# Patient Record
Sex: FEMALE | Race: WHITE | NOT HISPANIC OR LATINO | URBAN - METROPOLITAN AREA
[De-identification: names, ages, dates, MRNs, and addresses within clinical notes are randomized per-mention and may not be internally consistent; named-entity substitution may affect disease eponyms.]

---

## 2017-01-22 ENCOUNTER — INPATIENT (INPATIENT)
Facility: HOSPITAL | Age: 27
LOS: 1 days | Discharge: HOME | End: 2017-01-24
Attending: OBSTETRICS & GYNECOLOGY | Admitting: OBSTETRICS & GYNECOLOGY

## 2017-06-27 DIAGNOSIS — Z3A.40 40 WEEKS GESTATION OF PREGNANCY: ICD-10-CM

## 2019-03-08 PROBLEM — Z00.00 ENCOUNTER FOR PREVENTIVE HEALTH EXAMINATION: Status: ACTIVE | Noted: 2019-03-08

## 2019-04-16 ENCOUNTER — APPOINTMENT (OUTPATIENT)
Dept: SURGERY | Facility: CLINIC | Age: 29
End: 2019-04-16
Payer: SELF-PAY

## 2019-04-16 ENCOUNTER — TRANSCRIPTION ENCOUNTER (OUTPATIENT)
Age: 29
End: 2019-04-16

## 2019-04-16 VITALS
DIASTOLIC BLOOD PRESSURE: 82 MMHG | HEIGHT: 64 IN | SYSTOLIC BLOOD PRESSURE: 126 MMHG | WEIGHT: 285 LBS | BODY MASS INDEX: 48.65 KG/M2

## 2019-04-16 PROCEDURE — SI006: CPT

## 2019-05-17 ENCOUNTER — APPOINTMENT (OUTPATIENT)
Dept: SURGERY | Facility: CLINIC | Age: 29
End: 2019-05-17
Payer: MEDICAID

## 2019-05-17 VITALS
WEIGHT: 282.2 LBS | BODY MASS INDEX: 48.18 KG/M2 | SYSTOLIC BLOOD PRESSURE: 122 MMHG | DIASTOLIC BLOOD PRESSURE: 80 MMHG | HEIGHT: 64 IN

## 2019-05-17 DIAGNOSIS — Z78.9 OTHER SPECIFIED HEALTH STATUS: ICD-10-CM

## 2019-05-17 PROCEDURE — 99244 OFF/OP CNSLTJ NEW/EST MOD 40: CPT

## 2019-05-21 ENCOUNTER — APPOINTMENT (OUTPATIENT)
Dept: SURGERY | Facility: CLINIC | Age: 29
End: 2019-05-21
Payer: MEDICAID

## 2019-05-21 VITALS — WEIGHT: 280 LBS | HEIGHT: 64 IN | BODY MASS INDEX: 47.8 KG/M2

## 2019-05-21 PROCEDURE — 97802 MEDICAL NUTRITION INDIV IN: CPT

## 2019-05-24 NOTE — PHYSICAL EXAM
[Normal] : affect appropriate [de-identified] : Mallampati II [de-identified] : Soft, nondistended.

## 2019-05-24 NOTE — CONSULT LETTER
[Courtesy Letter:] : I had the pleasure of seeing your patient, [unfilled], in my office today. [Please see my note below.] : Please see my note below. [Consult Closing:] : Thank you very much for allowing me to participate in the care of this patient.  If you have any questions, please do not hesitate to contact me. [Sincerely,] : Sincerely, [Dear  ___] : Dear  [unfilled], [FreeTextEntry3] : Dottie Sage MD, FACS, FASMBS\par Chief, General, Bariatric & Minimally Invasive Surgery\par Director, Quality & Performance Improvement\par Director, General Surgery Residency Program\par Director, Advanced GI MIS Fellowship\par Department of Surgery\par [facility] \par [facility]\par

## 2019-05-24 NOTE — HISTORY OF PRESENT ILLNESS
[de-identified] : Patient  presented today for Bariatric Surgery Consultation for consideration of Laparoscopic Sleeve Gastrectomy. The patient states that she has been overweight for several years and has tried multiple weight loss modalities in the past without any long-term sustainable weight loss.\par

## 2019-05-24 NOTE — HISTORY OF PRESENT ILLNESS
[de-identified] : Patient  presented today for Bariatric Surgery Consultation for consideration of Laparoscopic Sleeve Gastrectomy. The patient states that she has been overweight for several years and has tried multiple weight loss modalities in the past without any long-term sustainable weight loss.\par

## 2019-05-24 NOTE — CONSULT LETTER
[Courtesy Letter:] : I had the pleasure of seeing your patient, [unfilled], in my office today. [Please see my note below.] : Please see my note below. [Consult Closing:] : Thank you very much for allowing me to participate in the care of this patient.  If you have any questions, please do not hesitate to contact me. [Sincerely,] : Sincerely, [Dear  ___] : Dear  [unfilled], [FreeTextEntry3] : Dottie Sage MD, FACS, FASMBS\par Chief, General, Bariatric & Minimally Invasive Surgery\par Director, Quality & Performance Improvement\par Director, General Surgery Residency Program\par Director, Advanced GI MIS Fellowship\par Department of Surgery\par [facility] \par [facility]\par

## 2019-05-24 NOTE — PHYSICAL EXAM
[Normal] : affect appropriate [de-identified] : Mallampati II [de-identified] : Soft, nondistended.

## 2019-05-24 NOTE — ASSESSMENT
[FreeTextEntry1] : XAVIER HARKINS is a 28 year female seen today for Bariatric consultation. In an effort to prepare for surgery she is exercising daily for 15-20 minutes.\par The surgical options for weight loss have been extensively discussed with the patient and questions answered. The patient was provided written and verbal education regarding the sleeve gastrectomy. The patient appears to have a reasonable understanding of the process and is ready to proceed.  Risks, including but not limited to:  anesthesia, death, bleeding, infection, leaks, blood clots, ulcers, malnutrition and need for additional operations have been reviewed.  The importance of a consistent diet and exercise regimen in regards to weight loss and maintenance has also been discussed and the patient agrees to actively participate in the process.  The importance of lifelong mineral and vitamin supplementation was also reviewed with the patient. The need to adhere to an appropriate diet and exercise regimen were emphasized; in particular the need to perform moderate to intense physical activity most days of the week.  No promises have been made in regards to any given outcome and possibility of inadequate weight loss as well as regain has been discussed with the patient.  The patient understands that a long-term commitment is necessary for long-term success.\par

## 2019-05-24 NOTE — DATA REVIEWED
[FreeTextEntry1] : Blood work reviewed.  Vitamin d 25 OH - 20.  Prescribed vitamin d 50,000 units weekly for 2 months then patient will take vitamin d 2000 units daily on completion of prescription.  Written information given to patient.

## 2019-05-24 NOTE — REASON FOR VISIT
[Initial Consult] : an initial consult for [Morbid Obesity (BMI>40)] : morbid obesity (bmi>40) [Friend] : friend [FreeTextEntry2] : Patient presents for Bariatric consultation.

## 2019-06-09 ENCOUNTER — FORM ENCOUNTER (OUTPATIENT)
Age: 29
End: 2019-06-09

## 2019-06-10 ENCOUNTER — OUTPATIENT (OUTPATIENT)
Dept: OUTPATIENT SERVICES | Facility: HOSPITAL | Age: 29
LOS: 1 days | Discharge: HOME | End: 2019-06-10
Payer: MEDICAID

## 2019-06-10 ENCOUNTER — OTHER (OUTPATIENT)
Age: 29
End: 2019-06-10

## 2019-06-10 DIAGNOSIS — E66.01 MORBID (SEVERE) OBESITY DUE TO EXCESS CALORIES: ICD-10-CM

## 2019-06-10 PROCEDURE — 76700 US EXAM ABDOM COMPLETE: CPT | Mod: 26

## 2019-06-12 ENCOUNTER — OUTPATIENT (OUTPATIENT)
Dept: OUTPATIENT SERVICES | Facility: HOSPITAL | Age: 29
LOS: 1 days | Discharge: HOME | End: 2019-06-12

## 2019-06-12 DIAGNOSIS — F50.9 EATING DISORDER, UNSPECIFIED: ICD-10-CM

## 2019-06-18 ENCOUNTER — APPOINTMENT (OUTPATIENT)
Dept: SURGERY | Facility: CLINIC | Age: 29
End: 2019-06-18
Payer: MEDICAID

## 2019-06-18 ENCOUNTER — APPOINTMENT (OUTPATIENT)
Dept: CARDIOLOGY | Facility: CLINIC | Age: 29
End: 2019-06-18
Payer: MEDICAID

## 2019-06-18 VITALS — BODY MASS INDEX: 45.14 KG/M2 | HEIGHT: 64 IN | WEIGHT: 264.4 LBS

## 2019-06-18 VITALS — DIASTOLIC BLOOD PRESSURE: 80 MMHG | SYSTOLIC BLOOD PRESSURE: 115 MMHG

## 2019-06-18 PROCEDURE — 93000 ELECTROCARDIOGRAM COMPLETE: CPT

## 2019-06-18 PROCEDURE — 99203 OFFICE O/P NEW LOW 30 MIN: CPT

## 2019-06-18 PROCEDURE — 99212 OFFICE O/P EST SF 10 MIN: CPT

## 2019-06-28 ENCOUNTER — CLINICAL ADVICE (OUTPATIENT)
Age: 29
End: 2019-06-28

## 2019-07-10 ENCOUNTER — APPOINTMENT (OUTPATIENT)
Dept: CARDIOLOGY | Facility: CLINIC | Age: 29
End: 2019-07-10
Payer: MEDICAID

## 2019-07-10 DIAGNOSIS — R01.1 CARDIAC MURMUR, UNSPECIFIED: ICD-10-CM

## 2019-07-10 PROCEDURE — 93306 TTE W/DOPPLER COMPLETE: CPT

## 2019-07-11 NOTE — ADDENDUM
[FreeTextEntry1] : Echo reviewed (see scanned report)\par \par Low-risk for intermediate-risk surgery\par

## 2019-07-11 NOTE — DISCUSSION/SUMMARY
[Procedure Intermediate Risk] : the procedure risk is intermediate [FreeTextEntry1] : Echo prior to clearance

## 2019-07-11 NOTE — PHYSICAL EXAM
[General Appearance - In No Acute Distress] : no acute distress [General Appearance - Well Developed] : well developed [Eyelids - No Xanthelasma] : the eyelids demonstrated no xanthelasmas [Normal Conjunctiva] : the conjunctiva exhibited no abnormalities [Respiration, Rhythm And Depth] : normal respiratory rhythm and effort [Exaggerated Use Of Accessory Muscles For Inspiration] : no accessory muscle use [Auscultation Breath Sounds / Voice Sounds] : lungs were clear to auscultation bilaterally [Heart Rate And Rhythm] : heart rate and rhythm were normal [Heart Sounds] : normal S1 and S2 [Abdomen Soft] : soft [Abdomen Tenderness] : non-tender [Abdomen Mass (___ Cm)] : no abdominal mass palpated [Gait - Sufficient For Exercise Testing] : the gait was sufficient for exercise testing [Abnormal Walk] : normal gait [Nail Clubbing] : no clubbing of the fingernails [Cyanosis, Localized] : no localized cyanosis [Skin Color & Pigmentation] : normal skin color and pigmentation [No Skin Ulcers] : no skin ulcer [] : no rash [Oriented To Time, Place, And Person] : oriented to person, place, and time [FreeTextEntry1] : 2/6 systolic, no edema

## 2019-07-11 NOTE — HISTORY OF PRESENT ILLNESS
[Scheduled Procedure ___] : a [unfilled] [Surgeon Name ___] : surgeon: [unfilled] [Fair] : Fair [FreeTextEntry1] : \par 29 yo F referred for preop cardiac risk stratification. Denies any recent chest pain, shortness of breath or palpitations. Chart and labs reviewed.\par \par \par

## 2019-07-16 ENCOUNTER — APPOINTMENT (OUTPATIENT)
Dept: SURGERY | Facility: CLINIC | Age: 29
End: 2019-07-16
Payer: MEDICAID

## 2019-07-16 VITALS — WEIGHT: 258 LBS | HEIGHT: 64 IN | BODY MASS INDEX: 44.05 KG/M2

## 2019-07-16 PROCEDURE — 99212 OFFICE O/P EST SF 10 MIN: CPT

## 2019-08-13 ENCOUNTER — APPOINTMENT (OUTPATIENT)
Dept: SURGERY | Facility: CLINIC | Age: 29
End: 2019-08-13
Payer: MEDICAID

## 2019-08-13 VITALS — BODY MASS INDEX: 43.02 KG/M2 | WEIGHT: 252 LBS | HEIGHT: 64 IN

## 2019-08-13 DIAGNOSIS — Z01.810 ENCOUNTER FOR PREPROCEDURAL CARDIOVASCULAR EXAMINATION: ICD-10-CM

## 2019-08-13 PROCEDURE — 99212 OFFICE O/P EST SF 10 MIN: CPT

## 2019-08-13 RX ORDER — MINOCYCLINE HYDROCHLORIDE 100 MG/1
100 CAPSULE ORAL DAILY
Refills: 0 | Status: DISCONTINUED | COMMUNITY
End: 2019-08-13

## 2019-08-13 RX ORDER — ERGOCALCIFEROL 1.25 MG/1
1.25 MG CAPSULE, LIQUID FILLED ORAL
Qty: 4 | Refills: 1 | Status: DISCONTINUED | COMMUNITY
Start: 2019-05-17 | End: 2019-08-13

## 2019-08-26 ENCOUNTER — OTHER (OUTPATIENT)
Age: 29
End: 2019-08-26

## 2019-08-30 ENCOUNTER — RESULT REVIEW (OUTPATIENT)
Age: 29
End: 2019-08-30

## 2019-09-10 ENCOUNTER — APPOINTMENT (OUTPATIENT)
Dept: SURGERY | Facility: CLINIC | Age: 29
End: 2019-09-10
Payer: MEDICAID

## 2019-09-10 VITALS — HEIGHT: 64 IN | BODY MASS INDEX: 42.61 KG/M2 | WEIGHT: 249.6 LBS

## 2019-09-10 PROCEDURE — 99212 OFFICE O/P EST SF 10 MIN: CPT

## 2019-10-01 ENCOUNTER — APPOINTMENT (OUTPATIENT)
Dept: SURGERY | Facility: CLINIC | Age: 29
End: 2019-10-01
Payer: MEDICAID

## 2019-10-01 VITALS — HEIGHT: 64 IN | BODY MASS INDEX: 42.51 KG/M2 | WEIGHT: 249 LBS

## 2019-10-01 PROCEDURE — 99212 OFFICE O/P EST SF 10 MIN: CPT

## 2019-10-14 ENCOUNTER — OUTPATIENT (OUTPATIENT)
Dept: OUTPATIENT SERVICES | Facility: HOSPITAL | Age: 29
LOS: 1 days | Discharge: HOME | End: 2019-10-14
Payer: COMMERCIAL

## 2019-10-14 VITALS
OXYGEN SATURATION: 99 % | HEART RATE: 60 BPM | SYSTOLIC BLOOD PRESSURE: 117 MMHG | TEMPERATURE: 98 F | HEIGHT: 64 IN | DIASTOLIC BLOOD PRESSURE: 64 MMHG | RESPIRATION RATE: 18 BRPM | WEIGHT: 244.71 LBS

## 2019-10-14 DIAGNOSIS — Z98.890 OTHER SPECIFIED POSTPROCEDURAL STATES: Chronic | ICD-10-CM

## 2019-10-14 DIAGNOSIS — E66.01 MORBID (SEVERE) OBESITY DUE TO EXCESS CALORIES: ICD-10-CM

## 2019-10-14 DIAGNOSIS — Z01.818 ENCOUNTER FOR OTHER PREPROCEDURAL EXAMINATION: ICD-10-CM

## 2019-10-14 DIAGNOSIS — K08.409 PARTIAL LOSS OF TEETH, UNSPECIFIED CAUSE, UNSPECIFIED CLASS: Chronic | ICD-10-CM

## 2019-10-14 LAB
ALBUMIN SERPL ELPH-MCNC: 5 G/DL — SIGNIFICANT CHANGE UP (ref 3.5–5.2)
ALP SERPL-CCNC: 67 U/L — SIGNIFICANT CHANGE UP (ref 30–115)
ALT FLD-CCNC: 15 U/L — SIGNIFICANT CHANGE UP (ref 0–41)
ANION GAP SERPL CALC-SCNC: 13 MMOL/L — SIGNIFICANT CHANGE UP (ref 7–14)
APPEARANCE UR: ABNORMAL
APTT BLD: 35.1 SEC — SIGNIFICANT CHANGE UP (ref 27–39.2)
AST SERPL-CCNC: 18 U/L — SIGNIFICANT CHANGE UP (ref 0–41)
BACTERIA # UR AUTO: ABNORMAL
BILIRUB SERPL-MCNC: 0.3 MG/DL — SIGNIFICANT CHANGE UP (ref 0.2–1.2)
BILIRUB UR-MCNC: NEGATIVE — SIGNIFICANT CHANGE UP
BLD GP AB SCN SERPL QL: SIGNIFICANT CHANGE UP
BUN SERPL-MCNC: 14 MG/DL — SIGNIFICANT CHANGE UP (ref 10–20)
CALCIUM SERPL-MCNC: 10.2 MG/DL — HIGH (ref 8.5–10.1)
CHLORIDE SERPL-SCNC: 102 MMOL/L — SIGNIFICANT CHANGE UP (ref 98–110)
CO2 SERPL-SCNC: 26 MMOL/L — SIGNIFICANT CHANGE UP (ref 17–32)
COLOR SPEC: YELLOW — SIGNIFICANT CHANGE UP
CREAT SERPL-MCNC: 0.9 MG/DL — SIGNIFICANT CHANGE UP (ref 0.7–1.5)
DIFF PNL FLD: ABNORMAL
EPI CELLS # UR: 4 /HPF — SIGNIFICANT CHANGE UP (ref 0–5)
GLUCOSE SERPL-MCNC: 96 MG/DL — SIGNIFICANT CHANGE UP (ref 70–99)
GLUCOSE UR QL: NEGATIVE — SIGNIFICANT CHANGE UP
HCT VFR BLD CALC: 41.9 % — SIGNIFICANT CHANGE UP (ref 37–47)
HGB BLD-MCNC: 14 G/DL — SIGNIFICANT CHANGE UP (ref 12–16)
HYALINE CASTS # UR AUTO: 2 /LPF — SIGNIFICANT CHANGE UP (ref 0–7)
INR BLD: 1.01 RATIO — SIGNIFICANT CHANGE UP (ref 0.65–1.3)
KETONES UR-MCNC: NEGATIVE — SIGNIFICANT CHANGE UP
LEUKOCYTE ESTERASE UR-ACNC: ABNORMAL
MCHC RBC-ENTMCNC: 29.7 PG — SIGNIFICANT CHANGE UP (ref 27–31)
MCHC RBC-ENTMCNC: 33.4 G/DL — SIGNIFICANT CHANGE UP (ref 32–37)
MCV RBC AUTO: 89 FL — SIGNIFICANT CHANGE UP (ref 81–99)
NITRITE UR-MCNC: NEGATIVE — SIGNIFICANT CHANGE UP
NRBC # BLD: 0 /100 WBCS — SIGNIFICANT CHANGE UP (ref 0–0)
PH UR: 5.5 — SIGNIFICANT CHANGE UP (ref 5–8)
PLATELET # BLD AUTO: 293 K/UL — SIGNIFICANT CHANGE UP (ref 130–400)
POTASSIUM SERPL-MCNC: 4.3 MMOL/L — SIGNIFICANT CHANGE UP (ref 3.5–5)
POTASSIUM SERPL-SCNC: 4.3 MMOL/L — SIGNIFICANT CHANGE UP (ref 3.5–5)
PROT SERPL-MCNC: 7.7 G/DL — SIGNIFICANT CHANGE UP (ref 6–8)
PROT UR-MCNC: ABNORMAL
PROTHROM AB SERPL-ACNC: 11.6 SEC — SIGNIFICANT CHANGE UP (ref 9.95–12.87)
RBC # BLD: 4.71 M/UL — SIGNIFICANT CHANGE UP (ref 4.2–5.4)
RBC # FLD: 12.2 % — SIGNIFICANT CHANGE UP (ref 11.5–14.5)
RBC CASTS # UR COMP ASSIST: 3 /HPF — SIGNIFICANT CHANGE UP (ref 0–4)
SODIUM SERPL-SCNC: 141 MMOL/L — SIGNIFICANT CHANGE UP (ref 135–146)
SP GR SPEC: 1.02 — SIGNIFICANT CHANGE UP (ref 1.01–1.02)
UROBILINOGEN FLD QL: SIGNIFICANT CHANGE UP
WBC # BLD: 8.13 K/UL — SIGNIFICANT CHANGE UP (ref 4.8–10.8)
WBC # FLD AUTO: 8.13 K/UL — SIGNIFICANT CHANGE UP (ref 4.8–10.8)
WBC UR QL: 35 /HPF — HIGH (ref 0–5)

## 2019-10-14 PROCEDURE — 93010 ELECTROCARDIOGRAM REPORT: CPT

## 2019-10-14 NOTE — H&P PST ADULT - REASON FOR ADMISSION
LOV 10/17/16  Refilled 6/16/17    Sent to pharmacy   Pt denies cp palp uri cough dysuria or sob. ET 1 FOS denies SOB . PT denies any open wounds, drainage or rashes. scheduled for dr ROGERS 10/22  LAP SLEEVE GASTRECTOMY POSSIBLE OPEN POSSIBLE INTRAOPERATIVE ENDOSCOPY.  CLEARANCE PACKET PENDING - PT STATES ALL MD CLEARANCES OBTAINED- WILL OBTAIN FROM DR ROGERS OFFICE  DENIES HX OF STEPHEN

## 2019-10-15 LAB
ESTIMATED AVERAGE GLUCOSE: 105 MG/DL — SIGNIFICANT CHANGE UP (ref 68–114)
HBA1C BLD-MCNC: 5.3 % — SIGNIFICANT CHANGE UP (ref 4–5.6)

## 2019-10-16 ENCOUNTER — APPOINTMENT (OUTPATIENT)
Dept: SURGERY | Facility: CLINIC | Age: 29
End: 2019-10-16
Payer: MEDICAID

## 2019-10-16 VITALS
HEIGHT: 64 IN | WEIGHT: 242.6 LBS | BODY MASS INDEX: 41.42 KG/M2 | SYSTOLIC BLOOD PRESSURE: 122 MMHG | DIASTOLIC BLOOD PRESSURE: 78 MMHG

## 2019-10-16 PROCEDURE — 99215 OFFICE O/P EST HI 40 MIN: CPT

## 2019-10-17 NOTE — ASSESSMENT
[FreeTextEntry1] : XAVIER HARKINS is a 28 year female seen today for Preoperative Bariatric visit. All medications were reviewed with the patient and instructions were given in respect to medications on the day of surgery.  Written instructions provided.\par \par

## 2019-10-17 NOTE — REASON FOR VISIT
[Follow-Up Visit] : a follow-up visit for [FreeTextEntry2] : Patient presents for Preoperative Bariatric visit

## 2019-10-17 NOTE — ADDENDUM
[FreeTextEntry1] : Patient seen, examined and reviewed with practitioner.  I agree with the assessment and plan.\par Risks, benefits and alternatives to the procedure were discussed with the patient and questions answered.  Consent was obtained. The patient is ready to proceed with surgery.\par

## 2019-10-17 NOTE — HISTORY OF PRESENT ILLNESS
[de-identified] : She is scheduled for Laparoscopic Sleeve Gastrectomy on 10/22/2019. The patient states that she has been overweight for several years and has tried multiple weight loss modalities in the past without any long-term sustainable weight loss. \par \par

## 2019-10-21 ENCOUNTER — OTHER (OUTPATIENT)
Age: 29
End: 2019-10-21

## 2019-10-21 RX ORDER — ACETAMINOPHEN AND CODEINE 300; 30 MG/1; MG/1
300-30 TABLET ORAL EVERY 6 HOURS
Qty: 8 | Refills: 0 | Status: DISCONTINUED | COMMUNITY
Start: 2019-10-21 | End: 2019-10-21

## 2019-10-21 RX ORDER — GABAPENTIN 250 MG/5ML
250 SOLUTION ORAL EVERY 8 HOURS
Qty: 15 | Refills: 0 | Status: DISCONTINUED | COMMUNITY
Start: 2019-10-16 | End: 2019-10-21

## 2019-10-22 ENCOUNTER — RESULT REVIEW (OUTPATIENT)
Age: 29
End: 2019-10-22

## 2019-10-22 ENCOUNTER — APPOINTMENT (OUTPATIENT)
Dept: SURGERY | Facility: HOSPITAL | Age: 29
End: 2019-10-22
Payer: MEDICAID

## 2019-10-22 ENCOUNTER — INPATIENT (INPATIENT)
Facility: HOSPITAL | Age: 29
LOS: 0 days | Discharge: HOME | End: 2019-10-23
Attending: SURGERY | Admitting: SURGERY
Payer: MEDICAID

## 2019-10-22 VITALS
WEIGHT: 242.07 LBS | RESPIRATION RATE: 18 BRPM | SYSTOLIC BLOOD PRESSURE: 124 MMHG | HEART RATE: 88 BPM | HEIGHT: 64 IN | DIASTOLIC BLOOD PRESSURE: 68 MMHG | TEMPERATURE: 98 F

## 2019-10-22 DIAGNOSIS — Z98.890 OTHER SPECIFIED POSTPROCEDURAL STATES: Chronic | ICD-10-CM

## 2019-10-22 DIAGNOSIS — K08.409 PARTIAL LOSS OF TEETH, UNSPECIFIED CAUSE, UNSPECIFIED CLASS: Chronic | ICD-10-CM

## 2019-10-22 LAB
ABO RH CONFIRMATION: SIGNIFICANT CHANGE UP
GLUCOSE BLDC GLUCOMTR-MCNC: 104 MG/DL — HIGH (ref 70–99)

## 2019-10-22 PROCEDURE — 43775 LAP SLEEVE GASTRECTOMY: CPT | Mod: 82

## 2019-10-22 PROCEDURE — 99222 1ST HOSP IP/OBS MODERATE 55: CPT

## 2019-10-22 PROCEDURE — 88307 TISSUE EXAM BY PATHOLOGIST: CPT | Mod: 26,59

## 2019-10-22 PROCEDURE — 43775 LAP SLEEVE GASTRECTOMY: CPT

## 2019-10-22 RX ORDER — KETOROLAC TROMETHAMINE 30 MG/ML
15 SYRINGE (ML) INJECTION EVERY 8 HOURS
Refills: 0 | Status: DISCONTINUED | OUTPATIENT
Start: 2019-10-22 | End: 2019-10-23

## 2019-10-22 RX ORDER — HEPARIN SODIUM 5000 [USP'U]/ML
5000 INJECTION INTRAVENOUS; SUBCUTANEOUS ONCE
Refills: 0 | Status: COMPLETED | OUTPATIENT
Start: 2019-10-22 | End: 2019-10-22

## 2019-10-22 RX ORDER — SODIUM CHLORIDE 9 MG/ML
1000 INJECTION, SOLUTION INTRAVENOUS
Refills: 0 | Status: DISCONTINUED | OUTPATIENT
Start: 2019-10-22 | End: 2019-10-22

## 2019-10-22 RX ORDER — GABAPENTIN 400 MG/1
125 CAPSULE ORAL EVERY 8 HOURS
Refills: 0 | Status: DISCONTINUED | OUTPATIENT
Start: 2019-10-22 | End: 2019-10-23

## 2019-10-22 RX ORDER — ONDANSETRON 8 MG/1
4 TABLET, FILM COATED ORAL EVERY 4 HOURS
Refills: 0 | Status: DISCONTINUED | OUTPATIENT
Start: 2019-10-22 | End: 2019-10-23

## 2019-10-22 RX ORDER — MORPHINE SULFATE 50 MG/1
30 CAPSULE, EXTENDED RELEASE ORAL
Refills: 0 | Status: DISCONTINUED | OUTPATIENT
Start: 2019-10-22 | End: 2019-10-23

## 2019-10-22 RX ORDER — SODIUM CHLORIDE 9 MG/ML
1000 INJECTION, SOLUTION INTRAVENOUS
Refills: 0 | Status: DISCONTINUED | OUTPATIENT
Start: 2019-10-22 | End: 2019-10-23

## 2019-10-22 RX ORDER — PANTOPRAZOLE SODIUM 20 MG/1
40 TABLET, DELAYED RELEASE ORAL DAILY
Refills: 0 | Status: DISCONTINUED | OUTPATIENT
Start: 2019-10-22 | End: 2019-10-23

## 2019-10-22 RX ORDER — BUPIVACAINE 13.3 MG/ML
20 INJECTION, SUSPENSION, LIPOSOMAL INFILTRATION ONCE
Refills: 0 | Status: DISCONTINUED | OUTPATIENT
Start: 2019-10-22 | End: 2019-10-23

## 2019-10-22 RX ORDER — MORPHINE SULFATE 50 MG/1
30 CAPSULE, EXTENDED RELEASE ORAL
Refills: 0 | Status: DISCONTINUED | OUTPATIENT
Start: 2019-10-22 | End: 2019-10-22

## 2019-10-22 RX ORDER — ACETAMINOPHEN 500 MG
1000 TABLET ORAL ONCE
Refills: 0 | Status: COMPLETED | OUTPATIENT
Start: 2019-10-22 | End: 2019-10-22

## 2019-10-22 RX ORDER — HYDROMORPHONE HYDROCHLORIDE 2 MG/ML
1 INJECTION INTRAMUSCULAR; INTRAVENOUS; SUBCUTANEOUS
Refills: 0 | Status: DISCONTINUED | OUTPATIENT
Start: 2019-10-22 | End: 2019-10-22

## 2019-10-22 RX ORDER — HYDROMORPHONE HYDROCHLORIDE 2 MG/ML
0.5 INJECTION INTRAMUSCULAR; INTRAVENOUS; SUBCUTANEOUS
Refills: 0 | Status: DISCONTINUED | OUTPATIENT
Start: 2019-10-22 | End: 2019-10-22

## 2019-10-22 RX ORDER — ACETAMINOPHEN 500 MG
1000 TABLET ORAL ONCE
Refills: 0 | Status: DISCONTINUED | OUTPATIENT
Start: 2019-10-22 | End: 2019-10-23

## 2019-10-22 RX ORDER — HEPARIN SODIUM 5000 [USP'U]/ML
5000 INJECTION INTRAVENOUS; SUBCUTANEOUS EVERY 8 HOURS
Refills: 0 | Status: DISCONTINUED | OUTPATIENT
Start: 2019-10-22 | End: 2019-10-23

## 2019-10-22 RX ORDER — CHLORHEXIDINE GLUCONATE 213 G/1000ML
1 SOLUTION TOPICAL
Refills: 0 | Status: DISCONTINUED | OUTPATIENT
Start: 2019-10-22 | End: 2019-10-23

## 2019-10-22 RX ORDER — GABAPENTIN 400 MG/1
125 CAPSULE ORAL EVERY 8 HOURS
Refills: 0 | Status: DISCONTINUED | OUTPATIENT
Start: 2019-10-22 | End: 2019-10-22

## 2019-10-22 RX ADMIN — Medication 15 MILLIGRAM(S): at 13:20

## 2019-10-22 RX ADMIN — GABAPENTIN 125 MILLIGRAM(S): 400 CAPSULE ORAL at 16:52

## 2019-10-22 RX ADMIN — Medication 400 MILLIGRAM(S): at 16:51

## 2019-10-22 RX ADMIN — SODIUM CHLORIDE 125 MILLILITER(S): 9 INJECTION, SOLUTION INTRAVENOUS at 10:33

## 2019-10-22 RX ADMIN — MORPHINE SULFATE 30 MILLILITER(S): 50 CAPSULE, EXTENDED RELEASE ORAL at 12:00

## 2019-10-22 RX ADMIN — Medication 15 MILLIGRAM(S): at 21:33

## 2019-10-22 RX ADMIN — Medication 15 MILLIGRAM(S): at 13:22

## 2019-10-22 RX ADMIN — PANTOPRAZOLE SODIUM 40 MILLIGRAM(S): 20 TABLET, DELAYED RELEASE ORAL at 11:52

## 2019-10-22 RX ADMIN — HEPARIN SODIUM 5000 UNIT(S): 5000 INJECTION INTRAVENOUS; SUBCUTANEOUS at 07:30

## 2019-10-22 RX ADMIN — HEPARIN SODIUM 5000 UNIT(S): 5000 INJECTION INTRAVENOUS; SUBCUTANEOUS at 13:21

## 2019-10-22 RX ADMIN — HEPARIN SODIUM 5000 UNIT(S): 5000 INJECTION INTRAVENOUS; SUBCUTANEOUS at 21:37

## 2019-10-22 RX ADMIN — Medication 1000 MILLIGRAM(S): at 17:00

## 2019-10-22 RX ADMIN — SODIUM CHLORIDE 125 MILLILITER(S): 9 INJECTION, SOLUTION INTRAVENOUS at 21:10

## 2019-10-22 RX ADMIN — Medication 15 MILLIGRAM(S): at 21:47

## 2019-10-22 NOTE — CHART NOTE - NSCHARTNOTEFT_GEN_A_CORE
Post Operative Check    Patient is post op from a Laparoscopic lysis of abdominal adhesions Block, transverse abdominis plane (TAP) Laparoscopic sleeve gastrectomy and is doing well post operatively. Patient has been ambulating frequently around the PACU and has used her PCA pump twice for pain control. Patient is pulling 2250 on IS and has voided 400cc. Patient is feeling very well.    Vitals  T(C): 36.9 (10-22-19 @ 13:00), Max: 37.3 (10-22-19 @ 09:23)  HR: 94 (10-22-19 @ 15:00) (58 - 100)  BP: 125/79 (10-22-19 @ 15:00) (124/68 - 144/82)  RR: 19 (10-22-19 @ 15:00) (12 - 24)  SpO2: 99% (10-22-19 @ 15:00) (98% - 100%)    10-22 @ 07:01  -  10-22 @ 15:49  --------------------------------------------------------  IN:  Total IN: 0 mL    OUT:    Voided: 400 mL  Total OUT: 400 mL    Total NET: -400 mL    PHYSICAL EXAM:  GENERAL: NAD, well-appearing  CHEST/LUNG: Clear to auscultation bilaterally  HEART: Regular rate and rhythm  ABDOMEN: Soft, mildly tender, obese abdomen   EXTREMITIES:  No clubbing, cyanosis, or edema    Labs:  CAPILLARY BLOOD GLUCOSE  POCT Blood Glucose.: 104 mg/dL (22 Oct 2019 05:46)    Assessment:  Patient is a 28y old Female s/p laparoscopic lysis of abdominal adhesions Block, transverse abdominis plane (TAP) Laparoscopic sleeve gastrectomy    Plan:  - NPO  - encourage ambulation  - IS 10x/hour

## 2019-10-22 NOTE — CONSULT NOTE ADULT - SUBJECTIVE AND OBJECTIVE BOX
SICU Consultation Note  ======================================================================================================  XAVIER HARKINS  [id number]    28y Female pmh of acne, obesity (BMI 41) presents today for elective laparoscopic sleeve gastrectomy. Patient was seen by cardiology and is a          Procedure:  s/p lap sleeve gastrectomy                      (BMI=41.6    )  OR time:      EBL:          IV Fluids:       Blood: None              UOP:    None, no isabel  Procedure Findings-      PAST MEDICAL & SURGICAL HISTORY:  Obesity  S/P endoscopy  [place] teeth extracted      Home Meds:   Home Medications:  Iron 100 Plus oral tablet: 1 tab(s) orally once a day (22 Oct 2019 06:17)  Multiple Vitamins oral capsule: 1 cap(s) orally once a day (22 Oct 2019 06:17)  Vitamin D3 2000 intl units oral tablet: 1 tab(s) orally once a day (22 Oct 2019 06:17)      Allergies:  No Known Allergies    Advanced Directives: Full Code d/w patient in PACU    CURRENT MEDICATIONS:   acetaminophen  IVPB .. 1000 milliGRAM(s) IV Intermittent once  BUpivacaine liposome 1.3% Injectable 20 milliLiter(s) Local Injection once  chlorhexidine 4% Liquid 1 Application(s) Topical <User Schedule>  heparin  Injectable 5000 Unit(s) SubCutaneous every 8 hours  HYDROmorphone  Injectable 0.5 milliGRAM(s) IV Push every 10 minutes PRN  HYDROmorphone  Injectable 1 milliGRAM(s) IV Push every 10 minutes PRN  lactated ringers. 1000 milliLiter(s) (100 mL/Hr) IV Continuous <Continuous>  lactated ringers. 1000 milliLiter(s) (125 mL/Hr) IV Continuous <Continuous>  morphine PCA (5 mG/mL) 30 milliLiter(s) PCA Continuous PCA Continuous  pantoprazole  Injectable 40 milliGRAM(s) IV Push daily            VITAL SIGNS, INS/OUTS (Last 24hours):    ICU Vital Signs Last 24 Hrs  T(C): 37.3 (22 Oct 2019 09:23), Max: 37.3 (22 Oct 2019 09:23)  T(F): 99.1 (22 Oct 2019 09:23), Max: 99.1 (22 Oct 2019 09:23)  HR: 64 (22 Oct 2019 10:25) (64 - 100)  BP: 141/85 (22 Oct 2019 10:25) (124/68 - 143/90)  BP(mean): 103 (22 Oct 2019 10:10) (103 - 107)  ABP: --  ABP(mean): --  RR: 16 (22 Oct 2019 10:25) (12 - 24)  SpO2: 98% (22 Oct 2019 10:25) (98% - 100%)    I&O's Summary      Height (cm): 162.56 (10-22-19)  Weight (kg): 109.8 (10-22-19)  BMI (kg/m2): 41.6 (10-22-19)  BSA (m2): 2.12 (10-22-19)    Physical Exam:  ---------------------------------------------------------------------------------------  GCS 15  Exam: A&Ox3, no focal deficits    RESPIRATORY:  Normal expansion/effort    CARDIOVASCULAR:  RRR      GASTROINTESTINAL:  Abdomen soft, mild tender  Trochar sites, dressing applied, no active bleeding    MUSCULOSKELETAL:  Extremities warm, pink, well-perfused.    DERM:  No skin breakdown     :   Exam:       Tubes/Lines/Drains   ----------------------------------------------------------------------------------------------------------  [x] Peripheral IV  [X] Urinary Catheter Isabel                                                   LABS  --------------------------------------------------------------------------------------  LFTs      Cardiac Markers        Coagulation      Arterial Blood Gas      Blood Sugar  CAPILLARY BLOOD GLUCOSE      POCT Blood Glucose.: 104 mg/dL (22 Oct 2019 05:46)      Urinalysis      Cultures            CT/XRAY/ECHO/TCD/EEG  ----------------------------------------------------------------------------------------------          --------------------------------------------------------------------------------------  Admit Diagnosis: E66.01,34174     Critical Care Diagnoses: bariatric surgery SICU Consultation Note  ======================================================================================================  XAVIER HARKINS  [id number]    28y Female pmh of acne, obesity (BMI 41) presents today for elective laparoscopic sleeve gastrectomy. Patient was seen by cardiology and is a low risk for intermediate surgery, Echo July 2019 EF 55-65%, normal diastolic function.  Patient tolerated the procedure without complications. She was extubated, brought to PACU and started on morphine PCA.  SICU consulted for hemodynamic monitoring.        Procedure:  s/p lap sleeve gastrectomy                      (BMI=41.6)  OR time: 2hrs      EBL: 5cc          IV Fluids:  1.3 L     Blood: None              UOP:    None, no isabel      PAST MEDICAL & SURGICAL HISTORY:  Obesity  S/P endoscopy  [place] teeth extracted      Home Meds:   Home Medications:  Iron 100 Plus oral tablet: 1 tab(s) orally once a day (22 Oct 2019 06:17)  Multiple Vitamins oral capsule: 1 cap(s) orally once a day (22 Oct 2019 06:17)  Vitamin D3 2000 intl units oral tablet: 1 tab(s) orally once a day (22 Oct 2019 06:17)      Allergies:  No Known Allergies    Advanced Directives: Full Code d/w patient in PACU    CURRENT MEDICATIONS:   acetaminophen  IVPB .. 1000 milliGRAM(s) IV Intermittent once  BUpivacaine liposome 1.3% Injectable 20 milliLiter(s) Local Injection once  chlorhexidine 4% Liquid 1 Application(s) Topical <User Schedule>  heparin  Injectable 5000 Unit(s) SubCutaneous every 8 hours  HYDROmorphone  Injectable 0.5 milliGRAM(s) IV Push every 10 minutes PRN  HYDROmorphone  Injectable 1 milliGRAM(s) IV Push every 10 minutes PRN  lactated ringers. 1000 milliLiter(s) (100 mL/Hr) IV Continuous <Continuous>  lactated ringers. 1000 milliLiter(s) (125 mL/Hr) IV Continuous <Continuous>  morphine PCA (5 mG/mL) 30 milliLiter(s) PCA Continuous PCA Continuous  pantoprazole  Injectable 40 milliGRAM(s) IV Push daily            VITAL SIGNS, INS/OUTS (Last 24hours):    ICU Vital Signs Last 24 Hrs  T(C): 37.3 (22 Oct 2019 09:23), Max: 37.3 (22 Oct 2019 09:23)  T(F): 99.1 (22 Oct 2019 09:23), Max: 99.1 (22 Oct 2019 09:23)  HR: 64 (22 Oct 2019 10:25) (64 - 100)  BP: 141/85 (22 Oct 2019 10:25) (124/68 - 143/90)  BP(mean): 103 (22 Oct 2019 10:10) (103 - 107)  ABP: --  ABP(mean): --  RR: 16 (22 Oct 2019 10:25) (12 - 24)  SpO2: 98% (22 Oct 2019 10:25) (98% - 100%)    I&O's Summary      Height (cm): 162.56 (10-22-19)  Weight (kg): 109.8 (10-22-19)  BMI (kg/m2): 41.6 (10-22-19)  BSA (m2): 2.12 (10-22-19)    Physical Exam:  ---------------------------------------------------------------------------------------  GCS 15  Exam: A&Ox3, no focal deficits    RESPIRATORY:  Normal expansion/effort    CARDIOVASCULAR:  RRR      GASTROINTESTINAL:  Abdomen soft, mild tender,   Trochar sites, dressing applied, no active bleeding    MUSCULOSKELETAL:  Extremities warm, pink, well-perfused.    DERM:  No skin breakdown     :   Exam: no isabel      Tubes/Lines/Drains   ----------------------------------------------------------------------------------------------------------  [x] Peripheral IV  [X] Urinary Catheter Isabel                                                   LABS  --------------------------------------------------------------------------------------  LFTs      Cardiac Markers        Coagulation      Arterial Blood Gas      Blood Sugar  CAPILLARY BLOOD GLUCOSE      POCT Blood Glucose.: 104 mg/dL (22 Oct 2019 05:46)      Urinalysis      Cultures            CT/XRAY/ECHO/TCD/EEG  ----------------------------------------------------------------------------------------------          --------------------------------------------------------------------------------------  Admit Diagnosis: [id number]     Critical Care Diagnoses: bariatric surgery

## 2019-10-22 NOTE — CHART NOTE - NSCHARTNOTEFT_GEN_A_CORE
PACU ANESTHESIA ADMISSION NOTE      Procedure: Laparoscopic lysis of abdominal adhesions  Block, transverse abdominis plane (TAP)  Laparoscopic sleeve gastrectomy    Post op diagnosis:  Morbid obesity      ____  Intubated  TV:______       Rate: ______      FiO2: ______    _X___  Patent Airway    ____  Full return of protective reflexes    ____  Full recovery from anesthesia / back to baseline status    Vitals:  T: 991F  HR: 103  BP:138/80  RR: 18  SpO2: -100%      Mental Status:  _X___ Awake   _____ Alert   _____ Drowsy   _____ Sedated    Nausea/Vomiting:  ___X_ NO  ______Yes,   See Post - Op Orders          Pain Scale (0-10):  _____    Treatment: ____ None    _X___ See Post - Op/PCA Orders    Post - Operative Fluids:   ____ Oral   ____X See Post - Op Orders    Plan: Discharge:   ____Home       _____Floor     ___X__Critical Care    _____  Other:_________________    Comments: No anesthetic related complications noted. Pt, transported to PACU, report endorsed to RN and sign out given to ICU team

## 2019-10-22 NOTE — BRIEF OPERATIVE NOTE - NSICDXBRIEFPROCEDURE_GEN_ALL_CORE_FT
PROCEDURES:  Laparoscopic lysis of abdominal adhesions 22-Oct-2019 09:26:57  Carlos Barrera  Block, transverse abdominis plane (TAP) 22-Oct-2019 09:14:21  Carlos Barrera  Laparoscopic sleeve gastrectomy 22-Oct-2019 09:14:09  Carlos Barrera

## 2019-10-22 NOTE — CONSULT NOTE ADULT - ATTENDING COMMENTS
I examined the patient with the PA and discussed my plan with them    the patient has mild post-operative pain and has a morphine pca, which she is using only minimally  she will ambulate and is due to void  kasandra diet starting tomorrow

## 2019-10-22 NOTE — CONSULT NOTE ADULT - ASSESSMENT
Assessment & Plan    28y Female  s/p elective lap sleeve gastrectomy, ALLAN, b/l tap block    NEURO:     Pain-controlled with morphine PCA    Gabapentin solution, monitor for any acute change in mental status    RESP:     Oxygen insufficiency-wean off NC to RA as tolerated    Ambulate as tolerated, no chronic resp dx      CARDS:     no chronic dx    keep normotensive, monitor for tachycardia    GI/NUTR:     s/p lap sleeve gastrectomy-keep NPO until 10/23 AM    Diet, NPO x/rx    GI prophylaxis-pantoprazole  Injectable 40 milliGRAM(s) IV Push daily    /RENAL:     No isabel, TOV at 1600    HEME/ONC:     DVT prophylaxis-HSQ    2330 labs    ID:     Prophylaxis-none as per primary team    ENDO:    FSG q6 while NPO     LINES/DRAINS: PIVy     DISPO: SICU approved by Dr. Zamora

## 2019-10-23 ENCOUNTER — TRANSCRIPTION ENCOUNTER (OUTPATIENT)
Age: 29
End: 2019-10-23

## 2019-10-23 VITALS
SYSTOLIC BLOOD PRESSURE: 127 MMHG | HEART RATE: 60 BPM | TEMPERATURE: 97 F | DIASTOLIC BLOOD PRESSURE: 59 MMHG | RESPIRATION RATE: 18 BRPM

## 2019-10-23 LAB
ANION GAP SERPL CALC-SCNC: 13 MMOL/L — SIGNIFICANT CHANGE UP (ref 7–14)
BUN SERPL-MCNC: 7 MG/DL — LOW (ref 10–20)
CALCIUM SERPL-MCNC: 9.3 MG/DL — SIGNIFICANT CHANGE UP (ref 8.5–10.1)
CHLORIDE SERPL-SCNC: 105 MMOL/L — SIGNIFICANT CHANGE UP (ref 98–110)
CO2 SERPL-SCNC: 21 MMOL/L — SIGNIFICANT CHANGE UP (ref 17–32)
CREAT SERPL-MCNC: 0.7 MG/DL — SIGNIFICANT CHANGE UP (ref 0.7–1.5)
GLUCOSE SERPL-MCNC: 99 MG/DL — SIGNIFICANT CHANGE UP (ref 70–99)
HCT VFR BLD CALC: 36.9 % — LOW (ref 37–47)
HGB BLD-MCNC: 12.5 G/DL — SIGNIFICANT CHANGE UP (ref 12–16)
MAGNESIUM SERPL-MCNC: 1.8 MG/DL — SIGNIFICANT CHANGE UP (ref 1.8–2.4)
MCHC RBC-ENTMCNC: 29.8 PG — SIGNIFICANT CHANGE UP (ref 27–31)
MCHC RBC-ENTMCNC: 33.9 G/DL — SIGNIFICANT CHANGE UP (ref 32–37)
MCV RBC AUTO: 87.9 FL — SIGNIFICANT CHANGE UP (ref 81–99)
NRBC # BLD: 0 /100 WBCS — SIGNIFICANT CHANGE UP (ref 0–0)
PHOSPHATE SERPL-MCNC: 3.4 MG/DL — SIGNIFICANT CHANGE UP (ref 2.1–4.9)
PLATELET # BLD AUTO: 275 K/UL — SIGNIFICANT CHANGE UP (ref 130–400)
POTASSIUM SERPL-MCNC: 4.3 MMOL/L — SIGNIFICANT CHANGE UP (ref 3.5–5)
POTASSIUM SERPL-SCNC: 4.3 MMOL/L — SIGNIFICANT CHANGE UP (ref 3.5–5)
RBC # BLD: 4.2 M/UL — SIGNIFICANT CHANGE UP (ref 4.2–5.4)
RBC # FLD: 12.6 % — SIGNIFICANT CHANGE UP (ref 11.5–14.5)
SODIUM SERPL-SCNC: 139 MMOL/L — SIGNIFICANT CHANGE UP (ref 135–146)
WBC # BLD: 11.18 K/UL — HIGH (ref 4.8–10.8)
WBC # FLD AUTO: 11.18 K/UL — HIGH (ref 4.8–10.8)

## 2019-10-23 PROCEDURE — 99231 SBSQ HOSP IP/OBS SF/LOW 25: CPT | Mod: 24

## 2019-10-23 RX ORDER — MAGNESIUM SULFATE 500 MG/ML
1 VIAL (ML) INJECTION ONCE
Refills: 0 | Status: COMPLETED | OUTPATIENT
Start: 2019-10-23 | End: 2019-10-23

## 2019-10-23 RX ADMIN — Medication 15 MILLIGRAM(S): at 05:49

## 2019-10-23 RX ADMIN — PANTOPRAZOLE SODIUM 40 MILLIGRAM(S): 20 TABLET, DELAYED RELEASE ORAL at 11:59

## 2019-10-23 RX ADMIN — HEPARIN SODIUM 5000 UNIT(S): 5000 INJECTION INTRAVENOUS; SUBCUTANEOUS at 13:11

## 2019-10-23 RX ADMIN — CHLORHEXIDINE GLUCONATE 1 APPLICATION(S): 213 SOLUTION TOPICAL at 07:30

## 2019-10-23 RX ADMIN — Medication 50 GRAM(S): at 06:04

## 2019-10-23 RX ADMIN — GABAPENTIN 125 MILLIGRAM(S): 400 CAPSULE ORAL at 12:46

## 2019-10-23 RX ADMIN — GABAPENTIN 125 MILLIGRAM(S): 400 CAPSULE ORAL at 01:08

## 2019-10-23 RX ADMIN — Medication 15 MILLIGRAM(S): at 06:23

## 2019-10-23 RX ADMIN — HEPARIN SODIUM 5000 UNIT(S): 5000 INJECTION INTRAVENOUS; SUBCUTANEOUS at 06:15

## 2019-10-23 NOTE — DISCHARGE NOTE PROVIDER - NSDCFUADDINST_GEN_ALL_CORE_FT
You are being discharged from [facility]. Please follow-up in outpatient clinic with Dr. Sage at your previously scheduled appointment. Please adhere to the diet that was previously discussed with Dr. Sage prior to surgery. Please take medications as prescribed. If you have any further questions about your care, please do not hesitate to contact Dr. Sage's clinic.

## 2019-10-23 NOTE — PROGRESS NOTE ADULT - ASSESSMENT
Assessment:  Patient is a 28y old Female s/p laparoscopic lysis of abdominal adhesions Block, transverse abdominis plane (TAP) Laparoscopic sleeve gastrectomy    Plan:  - care as per POD 1 kasandra protocol Assessment:  Patient is a 28y old Female s/p laparoscopic lysis of abdominal adhesions Block, transverse abdominis plane (TAP) Laparoscopic sleeve gastrectomy    Plan:  Phase 1 bariatric diet per protocol  OOB Ambulating  DVT/GI prophylaxis  Encourage IS

## 2019-10-23 NOTE — PROGRESS NOTE ADULT - SUBJECTIVE AND OBJECTIVE BOX
GENERAL SURGERY PROGRESS NOTE     XAVIER HARKINS  28y  Female  Hospital day: 2d  POD: 1d  Procedure: Laparoscopic lysis of abdominal adhesions  Block, transverse abdominis plane (TAP)  Laparoscopic sleeve gastrectomy    OVERNIGHT EVENTS: no acute events overnight. Patient was OOB to chair, ambulating frequently, consistently using IS 10x/hour.    T(F): 98.5 (10-23-19 @ 00:01), Max: 99.1 (10-22-19 @ 09:23)  HR: 56 (10-23-19 @ 02:03) (56 - 100)  BP: 108/56 (10-23-19 @ 02:03) (106/55 - 144/82)  RR: 19 (10-23-19 @ 02:03) (12 - 24)  SpO2: 96% (10-23-19 @ 02:03) (95% - 100%)    DIET/FLUIDS: lactated ringers. 1000 milliLiter(s) IV Continuous <Continuous>  magnesium sulfate  IVPB 1 Gram(s) IV Intermittent once    GI proph:  pantoprazole  Injectable 40 milliGRAM(s) IV Push daily    AC/ proph: heparin  Injectable 5000 Unit(s) SubCutaneous every 8 hours    PHYSICAL EXAM:  GENERAL: NAD, well-appearing  CHEST/LUNG: Clear to auscultation bilaterally  HEART: Regular rate and rhythm  ABDOMEN: Soft, mildly tender, obese abdomen   EXTREMITIES:  No clubbing, cyanosis, or edema    Labs:  CAPILLARY BLOOD GLUCOSE  POCT Blood Glucose.: 104 mg/dL (22 Oct 2019 05:46)                        12.5   11.18 )-----------( 275      ( 23 Oct 2019 00:22 )             36.9       10-23    139  |  105  |  7<L>  ----------------------------<  99  4.3   |  21  |  0.7    Calcium, Total Serum: 9.3 mg/dL (10-23-19 @ 00:22)

## 2019-10-23 NOTE — DISCHARGE NOTE PROVIDER - CARE PROVIDER_API CALL
Dottie Sage)  Surgery  [address] [place] [address], [address] [place] [address]  Phone: [phone]  Fax: [phone]  Follow Up Time:

## 2019-10-23 NOTE — PROGRESS NOTE ADULT - SUBJECTIVE AND OBJECTIVE BOX
XAVIER HARKINS  [id number]  28y Female    Indication for ICU admission: s/p lap sleeve gastrectomy, BMI 41  Admit Date:10-22-19  ICU Date: 10/22/19  OR Date:10/22/19    No Known Allergies    PAST MEDICAL & SURGICAL HISTORY:  Obesity  S/P endoscopy  [place] teeth extracted    Home Medications:  Iron 100 Plus oral tablet: 1 tab(s) orally once a day (22 Oct 2019 06:17)  Multiple Vitamins oral capsule: 1 cap(s) orally once a day (22 Oct 2019 06:17)  Vitamin D3 2000 intl units oral tablet: 1 tab(s) orally once a day (22 Oct 2019 06:17)    24HRS EVENT: Patient ambulating, voiding without difficulty. No acute overnight events    NEURO:     Pain-controlled with morphine PCA    Gabapentin solution, monitor for any acute change in mental status    RESP:     Oxygen insufficiency-wean off NC to RA as tolerated    Ambulate as tolerated, no chronic resp dx      CARDS:     no chronic dx    keep normotensive, monitor for tachycardia    GI/NUTR:     s/p lap sleeve gastrectomy-keep NPO until 10/23 AM    Diet, NPO x/rx    GI prophylaxis-pantoprazole  Injectable 40 milliGRAM(s) IV Push daily    /RENAL:     voided    HEME/ONC:     DVT prophylaxis-HSQ    Hgb 14    ID:     Prophylaxis-none as per primary team    ENDO:    FSG q6 while NPO     DVT Prophylaxis: heparin  Injectable 5000 Unit(s) SubCutaneous every 8 hours  GI Prophylaxis:pantoprazole  Injectable 40 milliGRAM(s) IV Push daily    ***Tubes/Lines/Drains  ***  Peripheral IV  Arterial Line		                  Central Line                            Urinary Catheter		Indication: Strict I&O

## 2019-10-23 NOTE — PROGRESS NOTE ADULT - ASSESSMENT
Assessment & Plan    28y Female  s/p elective lap sleeve gastrectomy, ALLAN, b/l tap block    NEURO:     Pain-controlled with morphine PCA    Gabapentin solution, monitor for any acute change in mental status    RESP:     Oxygen insufficiency-wean off NC to RA as tolerated    Ambulate as tolerated, no chronic resp dx      CARDS:     no chronic dx    keep normotensive, monitor for tachycardia    GI/NUTR:     s/p lap sleeve gastrectomy-NPO until 10/23 AM    Diet, NPO x/rx    GI prophylaxis-pantoprazole  Injectable 40 milliGRAM(s) IV Push daily    /RENAL:     Voiding    HEME/ONC:     DVT prophylaxis-HSQ    ID:     Prophylaxis-none as per primary team    ENDO:    FSG q6 while NPO

## 2019-10-23 NOTE — CHART NOTE - NSCHARTNOTEFT_GEN_A_CORE
27 y/o F BMI 41, POD1 s/p lap sleeve gastrectomy. Admitted to SICU for close monitoring of potential post op complications. 29 y/o F BMI 41, POD1 s/p lap sleeve gastrectomy. Admitted to SICU for close monitoring of potential post op complications.    NEURO:   Pain control:  - Morphine PCA, Gabapentin solution      RESP:   - No acute issues  - Currently on RA, maintain 02 sat >94%  - IS/PT/OOBTC- pulling 2000 on IS, ambulated      CARDS:   - No acute issues  - MAPS, stable, non tachycardic    GI/NUTR:   Diet:  - Bubba clears    Prophylaxis:  - PPI  - Senna      /RENAL:   - No acute issues  - Voiding  - LR@125  - Monitor Lytes and replete    HEME/ONC:   - No acute issues  - H/H stable  - Hep SQ    ID:   - No abx  - Afebrile, Reactive leukocytosis downtrending     ENDO:  - No acute issues    Signed out to:  Dr. Hills @ 13:10 29 y/o F BMI 41, POD1 s/p lap sleeve gastrectomy. Admitted to SICU for close monitoring of potential post op complications.    NEURO:   Pain control:  - Morphine PCA, Gabapentin solution      RESP:   - No acute issues  - Currently on RA, maintain 02 sat >94%  - IS/PT/OOBTC- pulling 2000 on IS, ambulated      CARDS:   - No acute issues  - MAPS, stable, non tachycardic    GI/NUTR:   Diet:  - Bubba clears    Prophylaxis:  - PPI    /RENAL:   - No acute issues  - Voiding  - LR@125  - Monitor Lytes and replete    HEME/ONC:   - No acute issues  - H/H stable  - Hep SQ    ID:   - No abx  - Afebrile, Reactive leukocytosis downtrending     ENDO:  - No acute issues    Signed out to:  Dr. Hills @ 13:10 27 y/o F BMI 41, POD1 s/p lap sleeve gastrectomy. Admitted to SICU for close monitoring of potential post op complications.    NEURO:   Pain control:  - Morphine PCA- d/c per primary?, Gabapentin solution      RESP:   - No acute issues  - Currently on RA, maintain 02 sat >94%  - IS/PT/OOBTC- pulling 2000 on IS, ambulated      CARDS:   - No acute issues  - MAPS, stable, non tachycardic    GI/NUTR:   Diet:  - Bubba clears    Prophylaxis:  - PPI    /RENAL:   - No acute issues  - Voiding  - LR@125  - Monitor Lytes and replete    HEME/ONC:   - No acute issues  - H/H stable  - Hep SQ    ID:   - No abx  - Afebrile, Reactive leukocytosis downtrending     ENDO:  - No acute issues    Signed out to:  Dr. Hills @ 13:10

## 2019-10-23 NOTE — DISCHARGE NOTE PROVIDER - HOSPITAL COURSE
Patient is a 28 year old female with past medical history of obesity (BMI 41) who was admitted after elective transverse abdominis plane block, laparoscopic lysis of adhesions, and laparoscopic sleeve gastrectomy. Patient was upgraded to SICU care for close monitoring and was given a PCA pump for pain control. She passed her trial of void postoperatively and is having adequate urine output. Her pain is well controlled, she is ambulating, tolerating the bariatric clears diet, and is stable for discharge with follow-up in clinic with Dr. Sage. Patient is a 28 year old female with past medical history of obesity (BMI 41) who was admitted after elective transverse abdominis plane block, laparoscopic lysis of adhesions, and laparoscopic sleeve gastrectomy. The patient did well postoperatively and is stable for discharge.

## 2019-10-23 NOTE — CHART NOTE - NSCHARTNOTEFT_GEN_A_CORE
27 YO F s/p Lap Sleeve - POD #1.  Tolerating kasandra clear liquid diet well at 2 oz/hr.  Has protein shakes and chewable vits/mins at home.  Patient verbalized understanding of phase I diet to begin upon discharge.  DROP sheet reviewed with patient - all questions answered.  Will follow up in office next week.  Call [phone] with questions/concerns.

## 2019-10-23 NOTE — DISCHARGE NOTE NURSING/CASE MANAGEMENT/SOCIAL WORK - PATIENT PORTAL LINK FT
You can access the FollowMyHealth Patient Portal offered by [facility] by registering at the following website: [email or web address]. By joining [employer]’s FollowMyHealth portal, you will also be able to view your health information using other applications (apps) compatible with our system.

## 2019-10-23 NOTE — PROGRESS NOTE ADULT - ATTENDING COMMENTS
I personally examined this patient with the PA and resident and discussed my plan with them.    no complications overnight  ambulating voiding, pain controlled  starting kasandra clears  downgrade
Patient clinically stable. Doing well  DC when tolerating adequate p.o.

## 2019-10-27 LAB — SURGICAL PATHOLOGY STUDY: SIGNIFICANT CHANGE UP

## 2019-10-28 DIAGNOSIS — E66.01 MORBID (SEVERE) OBESITY DUE TO EXCESS CALORIES: ICD-10-CM

## 2019-10-30 ENCOUNTER — APPOINTMENT (OUTPATIENT)
Dept: SURGERY | Facility: CLINIC | Age: 29
End: 2019-10-30
Payer: MEDICAID

## 2019-10-30 VITALS — HEIGHT: 64 IN | WEIGHT: 235.2 LBS | BODY MASS INDEX: 40.15 KG/M2

## 2019-10-30 PROBLEM — E66.9 OBESITY, UNSPECIFIED: Chronic | Status: ACTIVE | Noted: 2019-10-14

## 2019-10-30 PROCEDURE — 99024 POSTOP FOLLOW-UP VISIT: CPT

## 2019-10-30 RX ORDER — ACETAMINOPHEN AND CODEINE 300; 30 MG/1; MG/1
300-30 TABLET ORAL EVERY 6 HOURS
Qty: 8 | Refills: 0 | Status: DISCONTINUED | COMMUNITY
Start: 2019-10-21 | End: 2019-10-30

## 2019-10-30 NOTE — HISTORY OF PRESENT ILLNESS
[Procedure: ___] : Procedure performed: [unfilled]  [Date of Surgery: ___] : Date of Surgery:   [unfilled] [Surgeon Name:   ___] : Surgeon Name: Dr. BRAVO [___ Days Post Op] : [unfilled] days  [Phase 2] : Phase 2 [Walking] : walking

## 2019-11-13 NOTE — DATA REVIEWED
[FreeTextEntry1] : Pre-op Wt: 242.6 lbs. \par Wt. change since last visit: -7.4 lbs. \par TWL: 7.4 lbs. \par %EWL: 6%

## 2019-11-13 NOTE — ASSESSMENT
[___ Days Post Op] : [unfilled] days [Today's Weight: ___] : Today's weight:[unfilled] lbs [Today's BMI: ___] : Today's BMI: [unfilled] [de-identified] : 28F s/p sleeve gastrectomy, POD#7, doing well. She is taking about 57-60oz/day, tolerating phase 2 diet and is taking two protein shakes a day. Her exercise is walking daily and her energy level is great.\par \par Plan:\par Follow up in 3 weeks\par Call if any issues

## 2019-11-13 NOTE — REASON FOR VISIT
[Gastric Sleeve] : gastric sleeve [de-identified] : 10/23/19 [de-identified] : s/p lap sleeve gastrectomy

## 2019-11-13 NOTE — REVIEW OF SYSTEMS
[Negative] : Allergic/Immunologic [Fever] : no fever [Chills] : no chills [Night Sweats] : no night sweats

## 2019-11-22 ENCOUNTER — APPOINTMENT (OUTPATIENT)
Dept: SURGERY | Facility: CLINIC | Age: 29
End: 2019-11-22
Payer: MEDICAID

## 2019-11-22 VITALS — HEIGHT: 64 IN | WEIGHT: 220.8 LBS | BODY MASS INDEX: 37.69 KG/M2

## 2019-11-22 DIAGNOSIS — E66.01 MORBID (SEVERE) OBESITY DUE TO EXCESS CALORIES: ICD-10-CM

## 2019-11-22 PROCEDURE — 99024 POSTOP FOLLOW-UP VISIT: CPT

## 2019-11-22 RX ORDER — PANTOPRAZOLE 40 MG/1
40 TABLET, DELAYED RELEASE ORAL DAILY
Qty: 30 | Refills: 2 | Status: COMPLETED | COMMUNITY
Start: 2019-10-16 | End: 2019-11-22

## 2019-11-22 RX ORDER — IRON/IRON ASP GLY/FA/MV-MIN 38 125-25-1MG
TABLET ORAL DAILY
Refills: 0 | Status: ACTIVE | COMMUNITY

## 2019-11-22 RX ORDER — PEDI MULTIVIT NO.25/FOLIC ACID 300 MCG
60 TABLET,CHEWABLE ORAL DAILY
Refills: 0 | Status: COMPLETED | COMMUNITY
End: 2019-11-22

## 2019-11-28 ENCOUNTER — EMERGENCY (EMERGENCY)
Facility: HOSPITAL | Age: 29
LOS: 0 days | Discharge: HOME | End: 2019-11-28
Admitting: EMERGENCY MEDICINE
Payer: MEDICAID

## 2019-11-28 VITALS
HEIGHT: 64 IN | TEMPERATURE: 98 F | DIASTOLIC BLOOD PRESSURE: 76 MMHG | RESPIRATION RATE: 18 BRPM | SYSTOLIC BLOOD PRESSURE: 149 MMHG | HEART RATE: 80 BPM | OXYGEN SATURATION: 99 % | WEIGHT: 220.02 LBS

## 2019-11-28 DIAGNOSIS — Z98.890 OTHER SPECIFIED POSTPROCEDURAL STATES: Chronic | ICD-10-CM
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
## 2019-11-28 DIAGNOSIS — S93.401A SPRAIN OF UNSPECIFIED LIGAMENT OF RIGHT ANKLE, INITIAL ENCOUNTER: ICD-10-CM
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
## 2019-11-28 DIAGNOSIS — K08.409 PARTIAL LOSS OF TEETH, UNSPECIFIED CAUSE, UNSPECIFIED CLASS: Chronic | ICD-10-CM

## 2019-11-28 DIAGNOSIS — S99.919A UNSPECIFIED INJURY OF UNSPECIFIED ANKLE, INITIAL ENCOUNTER: ICD-10-CM

## 2019-11-28 PROCEDURE — 73610 X-RAY EXAM OF ANKLE: CPT | Mod: 26,RT

## 2019-11-28 PROCEDURE — 73630 X-RAY EXAM OF FOOT: CPT | Mod: 26,RT

## 2019-11-28 PROCEDURE — 29515 APPLICATION SHORT LEG SPLINT: CPT

## 2019-11-28 PROCEDURE — 99283 EMERGENCY DEPT VISIT LOW MDM: CPT | Mod: 25

## 2019-11-28 NOTE — ED PROVIDER NOTE - OBJECTIVE STATEMENT
29 y.o. female with a PMH of a gastric sleeve last month presented to the ER c/o Right foot pain after "twisting" foot last week.  Denies extremity weakness/paresthesias.  No other injuries.

## 2019-11-28 NOTE — ED ADULT NURSE NOTE - OBJECTIVE STATEMENT
Patient c/o right ankle pain x 2 days. Patient states she was walking on rocks and twisted it, at first it wasn't painful. Patient has a burning sensation going up right leg. On assessment no redness or swelling noted, no obvious signs of deformity noted. Patient states it difficult to bear weight to right leg. +pulses in right leg.

## 2019-11-28 NOTE — ED PROVIDER NOTE - PATIENT PORTAL LINK FT
You can access the FollowMyHealth Patient Portal offered by [facility] by registering at the following website: [email or web address]. By joining [employer]’s FollowMyHealth portal, you will also be able to view your health information using other applications (apps) compatible with our system.

## 2019-11-28 NOTE — ED ADULT NURSE NOTE - NSIMPLEMENTINTERV_GEN_ALL_ED
Implemented All Universal Safety Interventions:  [place] to call system. Call bell, personal items and telephone within reach. Instruct patient to call for assistance. Room bathroom lighting operational. Non-slip footwear when patient is off stretcher. Physically safe environment: no spills, clutter or unnecessary equipment. Stretcher in lowest position, wheels locked, appropriate side rails in place.

## 2019-11-28 NOTE — ED PROVIDER NOTE - CLINICAL SUMMARY MEDICAL DECISION MAKING FREE TEXT BOX
RICE; OTC Tylenol as needed; splint and crutches with instructions; ortho referral; pt will follow up with PCP in 2-3 days; any new or worsening symptoms, pt will return to ER.

## 2019-11-28 NOTE — ED PROVIDER NOTE - NSFOLLOWUPINSTRUCTIONS_ED_ALL_ED_FT
Ankle Sprain     An ankle sprain is a stretch or tear in one of the tough tissues (ligaments) that connect the bones in your ankle. An ankle sprain can happen when the ankle rolls outward (inversion sprain) or inward (eversion sprain).  What are the causes?  This condition is caused by rolling or twisting the ankle.  What increases the risk?  You are more likely to develop this condition if you play sports.  What are the signs or symptoms?  Symptoms of this condition include:  Pain in your ankle. Swelling. Bruising. This may happen right after you sprain your ankle or 1–2 days later.Trouble standing or walking.How is this diagnosed?  This condition is diagnosed with:  A physical exam. During the exam, your doctor will press on certain parts of your foot and ankle and try to move them in certain ways.X-ray imaging. These may be taken to see how bad the sprain is and to check for broken bones.How is this treated?  This condition may be treated with:  A brace or splint. This is used to keep the ankle from moving until it heals.An elastic bandage. This is used to support the ankle.Crutches.Pain medicine.Surgery. This may be needed if the sprain is very bad.Physical therapy. This may help to improve movement in the ankle.Follow these instructions at home:  If you have a brace or a splint:     Wear the brace or splint as told by your doctor. Remove it only as told by your doctor.Loosen the brace or splint if your toes:  Tingle. Lose feeling (become numb).Turn cold and blue.Keep the brace or splint clean.If the brace or splint is not waterproof:  Do not let it get wet.Cover it with a watertight covering when you take a bath or a shower.If you have an elastic bandage (dressing):     Remove it to shower or bathe. Try not to move your ankle much, but wiggle your toes from time to time. This helps to prevent swelling. Adjust the dressing if it feels too tight.Loosen the dressing if your foot:   Loses feeling.Tingles.Becomes cold and blue.Managing pain, stiffness, and swelling        Take over-the-counter and prescription medicines only as told by doctor.For 2–3 days, keep your ankle raised (elevated) above the level of your heart.If told, put ice on the injured area:  If you have a removable brace or splint, remove it as told by your doctor.Put ice in a plastic bag. Place a towel between your skin and the bag. Leave the ice on for 20 minutes, 2–3 times a day.General instructions     Rest your ankle.Do not use your injured leg to support your body weight until your doctor says that you can. Use crutches as told by your doctor.Do not use any products that contain nicotine or tobacco, such as cigarettes, e-cigarettes, and chewing tobacco. If you need help quitting, ask your doctor.Keep all follow-up visits as told by your doctor.Contact a doctor if:  Your bruises or swelling are quickly getting worse.Your pain does not get better after you take medicine.Get help right away if:  You cannot feel your toes or foot.Your foot or toes look blue.You have very bad pain that gets worse.Summary  An ankle sprain is a stretch or tear in one of the tough tissues (ligaments) that connect the bones in your ankle.This condition is caused by rolling or twisting the ankle.Symptoms include pain, swelling, bruising, and trouble walking.To help with pain and swelling, put ice on the injured ankle, raise your ankle above the level of your heart, and use an elastic bandage. Also, rest as told by your doctor.Keep all follow-up visits as told by your doctor. This is important.This information is not intended to replace advice given to you by your health care provider. Make sure you discuss any questions you have with your health care provider.    Document Released: 06/05/2009 Document Revised: 05/14/2019 Document Reviewed: 05/14/2019  [employer] Interactive Patient Education © 2019 [employer]

## 2019-11-28 NOTE — ED PROVIDER NOTE - CARE PROVIDER_API CALL
Brennan Marshall (MD)  Orthopaedic Surgery  [address] [place] [address]  Phone: [phone]  Fax: [phone]  Follow Up Time: 1-3 Days

## 2019-12-02 NOTE — DATA REVIEWED
[FreeTextEntry1] : \par Wt. change since last visit: -14.4 lbs\par %EWL: 18\par TWL: 21.8 lbs\par BMI: 37.9\par

## 2019-12-02 NOTE — PHYSICAL EXAM
[Normal] : normal appearance, no rash, nodules, vesicles, ulcers, erythema [de-identified] : Soft, nondistended.  Well healed incisions

## 2019-12-02 NOTE — HISTORY OF PRESENT ILLNESS
[Procedure: ___] : Procedure performed: [unfilled]  [Date of Surgery: ___] : Date of Surgery:   [unfilled] [Pre-Op Weight ___] : Pre-op weight was [unfilled] lbs [___ Months Post Op] : [unfilled] months [de-identified] : Patient had surgery approximately 1 month ago. \par She denies heartburn/reflux/vomiting and food intolerance.\par

## 2019-12-02 NOTE — REASON FOR VISIT
[Post Operative Visit] : a post operative visit for [Other___] : [unfilled] [FreeTextEntry2] : Sleeve Gastrectomy on 10/23/19

## 2019-12-02 NOTE — PLAN
[FreeTextEntry1] : PLAN:  Start Actigall on 11/23/19.\par             CMP.\par             Continue with diet and exercise.\par             RTO in 2 months with blood work.\par             Call with concerns.

## 2019-12-02 NOTE — ASSESSMENT
[FreeTextEntry1] : XAVIER HARKINS is a 29 year female seen today for Bariatric postoperative visit. Patient is doing well.\par Patient is compliant with dietary guidelines.\par She eats 3 meals/day.  Breakfast - cottage cheese, [employer] yogurt, oatmeal 1 day/week or 1 egg.  Lunch - protein shake with 1 scoop of protein powder, cottage cheese with spinach or salmon bites.  Dinner - tuna fish, 1/2 veggie burger, shrimp bites with 1-2 spoons of cauliflower rice. \par Fluid intake is adequate with mainly water.\par She is walking daily for 90 minutes. She will add weight bearing exercises in 1 month.\par \par

## 2020-01-03 ENCOUNTER — CLINICAL ADVICE (OUTPATIENT)
Age: 30
End: 2020-01-03

## 2020-01-06 ENCOUNTER — RESULT REVIEW (OUTPATIENT)
Age: 30
End: 2020-01-06

## 2020-01-21 DIAGNOSIS — E56.9 VITAMIN DEFICIENCY, UNSPECIFIED: ICD-10-CM

## 2020-01-24 ENCOUNTER — APPOINTMENT (OUTPATIENT)
Dept: SURGERY | Facility: CLINIC | Age: 30
End: 2020-01-24
Payer: MEDICAID

## 2020-01-24 VITALS — HEIGHT: 64 IN | BODY MASS INDEX: 34.59 KG/M2 | WEIGHT: 202.6 LBS

## 2020-01-24 DIAGNOSIS — E55.9 VITAMIN D DEFICIENCY, UNSPECIFIED: ICD-10-CM

## 2020-01-24 PROCEDURE — 99213 OFFICE O/P EST LOW 20 MIN: CPT

## 2020-01-24 RX ORDER — MULTIVITAMIN WITH IRON
TABLET ORAL DAILY
Refills: 0 | Status: ACTIVE | COMMUNITY

## 2020-01-24 RX ORDER — BIOTIN 10 MG
TABLET ORAL DAILY
Refills: 0 | Status: ACTIVE | COMMUNITY

## 2020-01-24 RX ORDER — PANTOPRAZOLE 40 MG/1
40 TABLET, DELAYED RELEASE ORAL DAILY
Refills: 0 | Status: DISCONTINUED | COMMUNITY
End: 2020-01-24

## 2020-02-04 NOTE — PLAN
[FreeTextEntry1] : PLAN: Continue with diet and exercise.\par            RTO in 3 months with blood work.\par            Call with concerns.

## 2020-02-04 NOTE — DATA REVIEWED
[FreeTextEntry1] : Wt. change since last visit: -18.2 lbs\par %EWL: 33\par TWL: 40 lbs\par BMI: 34\par \par Blood work reviewed - all essentially within normal limits.

## 2020-02-04 NOTE — HISTORY OF PRESENT ILLNESS
[Procedure: ___] : Procedure performed: [unfilled]  [Date of Surgery: ___] : Date of Surgery:   [unfilled] [___ Months Post Op] : [unfilled] months [Pre-Op Weight ___] : Pre-op weight was [unfilled] lbs [de-identified] : Patient had surgery approximately 3 months ago. \par She denies heartburn/reflux/vomiting and food intolerance. Constipation improved with smooth move teas.\par  \par

## 2020-02-04 NOTE — REASON FOR VISIT
[Other___] : [unfilled] [Follow-Up Visit] : a follow-up visit for [FreeTextEntry2] : Sleeve Gastrectomy on 10/23/19

## 2020-02-04 NOTE — ASSESSMENT
[FreeTextEntry1] : XAVIER HARKINS is a 29 year female seen today for Bariatric follow up visit.  Patient is doing well.\par Patient is compliant with dietary guidelines.\par She eats 3 meals/day. Breakfast - cottage cheese, Greek yogurt, or 1 egg. Lunch - protein shake with 1 scoop of protein powder, cottage cheese with spinach or deli turkey. Dinner - tuna fish, 1/2 veggie burger, turkey balls with 1-2 spoons of cauliflower rice. \par Fluid intake is adequate with mainly water with 1 cup of decaffeinated coffee.\par She is walking daily for 60 minutes and she joined the gym 2 weeks ago and is using the stair master and the treadmill for 45 minutes 5 days/week. Recommend adding weight bearing exercises.\par \par  \par

## 2020-03-03 ENCOUNTER — EMERGENCY (EMERGENCY)
Facility: HOSPITAL | Age: 30
LOS: 0 days | Discharge: HOME | End: 2020-03-03
Attending: EMERGENCY MEDICINE | Admitting: EMERGENCY MEDICINE
Payer: MEDICAID

## 2020-03-03 VITALS
TEMPERATURE: 99 F | RESPIRATION RATE: 18 BRPM | HEART RATE: 61 BPM | DIASTOLIC BLOOD PRESSURE: 65 MMHG | SYSTOLIC BLOOD PRESSURE: 132 MMHG | OXYGEN SATURATION: 100 % | WEIGHT: 119.93 LBS

## 2020-03-03 DIAGNOSIS — R42 DIZZINESS AND GIDDINESS: ICD-10-CM

## 2020-03-03 DIAGNOSIS — Z98.890 OTHER SPECIFIED POSTPROCEDURAL STATES: Chronic | ICD-10-CM

## 2020-03-03 DIAGNOSIS — Z98.890 OTHER SPECIFIED POSTPROCEDURAL STATES: ICD-10-CM

## 2020-03-03 DIAGNOSIS — Z98.84 BARIATRIC SURGERY STATUS: Chronic | ICD-10-CM

## 2020-03-03 DIAGNOSIS — K08.409 PARTIAL LOSS OF TEETH, UNSPECIFIED CAUSE, UNSPECIFIED CLASS: Chronic | ICD-10-CM

## 2020-03-03 PROCEDURE — 99284 EMERGENCY DEPT VISIT MOD MDM: CPT

## 2020-03-03 RX ORDER — METOCLOPRAMIDE HCL 10 MG
2 TABLET ORAL
Qty: 18 | Refills: 0
Start: 2020-03-03 | End: 2020-03-05

## 2020-03-03 RX ORDER — MECLIZINE HCL 12.5 MG
1 TABLET ORAL
Qty: 14 | Refills: 0
Start: 2020-03-03 | End: 2020-03-09

## 2020-03-03 RX ORDER — MECLIZINE HCL 12.5 MG
25 TABLET ORAL ONCE
Refills: 0 | Status: COMPLETED | OUTPATIENT
Start: 2020-03-03 | End: 2020-03-03

## 2020-03-03 RX ADMIN — Medication 25 MILLIGRAM(S): at 03:30

## 2020-03-03 NOTE — ED PROVIDER NOTE - ATTENDING CONTRIBUTION TO CARE
29y f h/o gastric sleeve p/w dizziness since 5pm. Spinning sensation, worse w/head mvmt. No ha, blurry vision, cp/sob, nv, abd pain, focal numbness or weakness. No falls or head trauma.     PE:  nad  skin warm, dry  ncat  neck supple  rrr nl s1s2 no mrg  ctab no wrr  abd soft ntnd no palpable masses no rgr  back non-tender no cvat  ext no cce dpi  neuro aaox3, cn 2-12 intact bl no nystagmus or facial droop, 5/5 strength x 4 no drift, gross sensation intact, finger-nose nl, gait nl, romberg neg

## 2020-03-03 NOTE — ED PROVIDER NOTE - PROVIDER TOKENS
FREE:[LAST:[Agustin],PHONE:[(   )    -],FAX:[(   )    -],ADDRESS:[your PMD Dr. Velez],FOLLOWUP:[7-10 Days],ESTABLISHEDPATIENT:[T]],PROVIDER:[TOKEN:[[id number]],FOLLOWUP:[7-10 Days]]

## 2020-03-03 NOTE — ED PROVIDER NOTE - CARE PROVIDER_API CALL
Agustin,   your PMD Dr. Velez  Phone: (   )    -  Fax: (   )    -  Established Patient  Follow Up Time: 7-10 Days    Troy Adkins)  Otolaryngology  [address] [place] [address] [address] [place] [address]  Phone: [phone]  Fax: [phone]  Follow Up Time: 7-10 Days

## 2020-03-03 NOTE — ED PROVIDER NOTE - OBJECTIVE STATEMENT
29 y.o F w/ pmhx gastric sleeve done 10/2019 p/w sudden onset dizziness that began at 5pm tonight, constant, worse with head movement. Pt has never had symptoms like this before. Pt states she tried to drink water thinking she was dehydrated but dizziness didn't improve. Pt waited to find someone to take care of her daughter and came to ED as soon as she could. Otherwise, pt denies fever, no chills, no blurry vision or change in vision, pt states she had singing in her R ear earlier which is no longer there, no change in hearing, no rhinorrhea, no cough, no HA, no CP, no SOB, no n/v, no abd pain, no numbness or tingling. LMP a few days ago. pt has IUD.

## 2020-03-03 NOTE — ED ADULT NURSE NOTE - OBJECTIVE STATEMENT
Pt comes from home complaining of dizziness and room spinning. No hx of vertigo. No other complaints.

## 2020-03-03 NOTE — ED PROVIDER NOTE - PHYSICAL EXAMINATION
CONSTITUTIONAL: well-appearing, in NAD  SKIN: Warm dry, normal skin turgor  HEAD: NCAT  EYES: EOMI, PERRLA, no scleral icterus, conjunctiva pink  ENT: normal pharynx with no erythema or exudates  NECK: Supple; non tender. Full ROM.  CARD: RRR, no murmurs.  RESP: clear to ausculation b/l. No crackles or wheezing.  ABD: soft, non-tender, non-distended, no rebound or guarding.  EXT: Full ROM, no bony tenderness, no pedal edema, no calf tenderness  NEURO: normal motor. normal sensory. CN II-XII intact. Cerebellar testing normal. Normal gait. Normal Head impulse.  PSYCH: Cooperative, appropriate.

## 2020-03-03 NOTE — ED PROVIDER NOTE - PATIENT PORTAL LINK FT
You can access the FollowMyHealth Patient Portal offered by [facility] by registering at the following website: [email or web address]. By joining [employer]’s FollowMyHealth portal, you will also be able to view your health information using other applications (apps) compatible with our system.

## 2020-03-03 NOTE — ED PROVIDER NOTE - CARE PROVIDERS DIRECT ADDRESSES
,[email or web address],[email or web address]

## 2020-03-23 ENCOUNTER — APPOINTMENT (OUTPATIENT)
Dept: OTOLARYNGOLOGY | Facility: CLINIC | Age: 30
End: 2020-03-23

## 2020-04-21 ENCOUNTER — APPOINTMENT (OUTPATIENT)
Dept: SURGERY | Facility: CLINIC | Age: 30
End: 2020-04-21
Payer: MEDICAID

## 2020-04-21 VITALS — HEIGHT: 64 IN | WEIGHT: 186 LBS | BODY MASS INDEX: 31.76 KG/M2

## 2020-04-21 PROCEDURE — ZZZZZ: CPT

## 2020-04-24 ENCOUNTER — APPOINTMENT (OUTPATIENT)
Dept: SURGERY | Facility: CLINIC | Age: 30
End: 2020-04-24

## 2020-06-04 ENCOUNTER — APPOINTMENT (OUTPATIENT)
Dept: OTOLARYNGOLOGY | Facility: CLINIC | Age: 30
End: 2020-06-04
Payer: MEDICAID

## 2020-06-04 PROCEDURE — 92557 COMPREHENSIVE HEARING TEST: CPT

## 2020-06-04 PROCEDURE — 92550 TYMPANOMETRY & REFLEX THRESH: CPT

## 2020-06-04 PROCEDURE — 99204 OFFICE O/P NEW MOD 45 MIN: CPT | Mod: 25

## 2020-06-04 NOTE — HISTORY OF PRESENT ILLNESS
[de-identified] : 29 year old patient is present today for dizziness. Patient states it started in March. She states it happens in the morning with room spinning. She notices when she sleeps on her right side she feels herself spinning. Patient admits high pitched noise in her ears. Dizziness still occurs not as frequent. She has episodes when turning to the right in bed that lasts seconds.

## 2020-06-15 ENCOUNTER — RX RENEWAL (OUTPATIENT)
Age: 30
End: 2020-06-15

## 2020-07-01 ENCOUNTER — OUTPATIENT (OUTPATIENT)
Dept: OUTPATIENT SERVICES | Facility: HOSPITAL | Age: 30
LOS: 1 days | Discharge: HOME | End: 2020-07-01
Payer: MEDICAID

## 2020-07-01 DIAGNOSIS — R42 DIZZINESS AND GIDDINESS: ICD-10-CM

## 2020-07-01 DIAGNOSIS — Z98.890 OTHER SPECIFIED POSTPROCEDURAL STATES: Chronic | ICD-10-CM

## 2020-07-01 DIAGNOSIS — K08.409 PARTIAL LOSS OF TEETH, UNSPECIFIED CAUSE, UNSPECIFIED CLASS: Chronic | ICD-10-CM

## 2020-07-01 DIAGNOSIS — Z98.84 BARIATRIC SURGERY STATUS: Chronic | ICD-10-CM

## 2020-07-01 PROCEDURE — 70553 MRI BRAIN STEM W/O & W/DYE: CPT | Mod: 26

## 2020-07-21 ENCOUNTER — APPOINTMENT (OUTPATIENT)
Dept: SURGERY | Facility: CLINIC | Age: 30
End: 2020-07-21

## 2020-07-22 ENCOUNTER — APPOINTMENT (OUTPATIENT)
Dept: OTOLARYNGOLOGY | Facility: CLINIC | Age: 30
End: 2020-07-22
Payer: MEDICAID

## 2020-07-22 DIAGNOSIS — H81.10 BENIGN PAROXYSMAL VERTIGO, UNSPECIFIED EAR: ICD-10-CM

## 2020-07-22 DIAGNOSIS — H93.13 TINNITUS, BILATERAL: ICD-10-CM

## 2020-07-22 PROCEDURE — 99213 OFFICE O/P EST LOW 20 MIN: CPT

## 2020-07-22 NOTE — HISTORY OF PRESENT ILLNESS
[FreeTextEntry1] : Patient presents today following up on dizziness. Patient had MRI performed. She notes dizziness is still present when exercising or turning to the right side. MRI is negative and unremarkable as reviewed by me. ringing has gone away. Pt has deferred therapy until MRI.

## 2020-08-04 ENCOUNTER — APPOINTMENT (OUTPATIENT)
Dept: SURGERY | Facility: CLINIC | Age: 30
End: 2020-08-04
Payer: MEDICAID

## 2020-08-04 VITALS — BODY MASS INDEX: 29.88 KG/M2 | HEIGHT: 64 IN | WEIGHT: 175 LBS

## 2020-08-04 DIAGNOSIS — R42 DIZZINESS AND GIDDINESS: ICD-10-CM

## 2020-08-04 PROCEDURE — 99212 OFFICE O/P EST SF 10 MIN: CPT | Mod: 95

## 2020-08-04 RX ORDER — AMOXICILLIN 500 MG
CAPSULE ORAL DAILY
Refills: 0 | Status: ACTIVE | COMMUNITY

## 2020-08-04 RX ORDER — NICOTINE 11MG/24HR
50 MCG PATCH, TRANSDERMAL 24 HOURS TRANSDERMAL DAILY
Refills: 0 | Status: ACTIVE | COMMUNITY

## 2020-08-04 RX ORDER — URSODIOL 300 MG/1
300 CAPSULE ORAL
Qty: 60 | Refills: 5 | Status: DISCONTINUED | COMMUNITY
Start: 2019-11-22 | End: 2020-08-04

## 2020-08-04 NOTE — PLAN
[FreeTextEntry1] : PLAN:  Continue with diet and exercise.\par             RTO in 2 months for 1 year visit.\par             Mail blood work requisition.\par             Call with concerns.

## 2020-08-04 NOTE — HISTORY OF PRESENT ILLNESS
[Procedure: ___] : Procedure performed: [unfilled]  [Date of Surgery: ___] : Date of Surgery:   [unfilled] [Pre-Op Weight ___] : Pre-op weight was [unfilled] lbs [___ Months Post Op] : [unfilled] months [de-identified] : The visit was provided via Telehealth using real-time 2 way audio visual technology.  The patient XAVIER HARKINS was located at home [address] [address] at the time of the visit.  \par The provider, ROSAURA CISNEROS, was located at the medical office at [address] [place] [address] at the time of the visit.  The patient XAVIER HARKINS and provider participated in the telehealth encounter.  Verbal consent was given on 8/4/2020 by the patient XAVIER HARKINS , self.\par  [de-identified] : Patient had surgery approximately 10 months ago. She followed up with Dr. Adkins with c/o of dizziness earlier this month. \par She denies heartburn/reflux/vomiting and food intolerance. Constipation improved with smooth move teas.\par  \par

## 2020-08-04 NOTE — DATA REVIEWED
[FreeTextEntry1] : Wt. change since last visit: -11 lbs\par %EWL: 55\par TWL: 67.6 lbs\par BMI: 30\par

## 2020-08-04 NOTE — ASSESSMENT
[FreeTextEntry1] : XAVIER HARKINS is a 29 year female seen today for Bariatric follow up visit. Patient is doing well with her weight loss goals..\par Patient is compliant with dietary guidelines.\par She eats 3 meals/day. Breakfast - cottage cheese, Greek yogurt, or protein coffee. Lunch - cottage cheese with spinach or deli turkey. Dinner - tuna fish, 1/2 veggie burger, turkey balls with 1-2 spoons of cauliflower rice. \par Fluid intake is adequate with mainly water. She often drinks ~ with 150 ounces daily.\par She is walking, jogging or doing a vi[employer] camp class for 45-60 minutes 5-6 days/week.\par \par

## 2020-10-20 ENCOUNTER — APPOINTMENT (OUTPATIENT)
Dept: SURGERY | Facility: CLINIC | Age: 30
End: 2020-10-20
Payer: MEDICAID

## 2020-10-20 VITALS — HEIGHT: 64 IN | WEIGHT: 168 LBS | BODY MASS INDEX: 28.68 KG/M2

## 2020-10-20 DIAGNOSIS — L70.0 ACNE VULGARIS: ICD-10-CM

## 2020-10-20 DIAGNOSIS — Z86.39 PERSONAL HISTORY OF OTHER ENDOCRINE, NUTRITIONAL AND METABOLIC DISEASE: ICD-10-CM

## 2020-10-20 PROCEDURE — 99212 OFFICE O/P EST SF 10 MIN: CPT | Mod: 95

## 2020-10-20 RX ORDER — MULTIVIT-MINS NO.5/FOLIC ACID 1 MG
CAPSULE ORAL DAILY
Refills: 0 | Status: ACTIVE | COMMUNITY

## 2020-10-20 NOTE — DATA REVIEWED
[FreeTextEntry1] : Wt. change since last visit:- 7 lbs \par %EWL: 61\par TWL: 74.6 lbs\par BMI: 28.8\par \par Blood work reviewed with patient. All essentially within normal limits.\par \par

## 2020-10-20 NOTE — REASON FOR VISIT
[S/P Bariatric Surgery] : s/p bariatric surgery [FreeTextEntry2] : Sleeve Gastrectomy on 10/23/2019 [Follow-Up Visit] : a follow-up visit for

## 2020-10-20 NOTE — ASSESSMENT
[FreeTextEntry1] : XAVIER HARKINS is a 29 year female seen today for Bariatric follow up visit. Patient is doing well with her weight loss goals..\par Patient is compliant with dietary guidelines and continues to plan her meals.\par She eats 3 meals/day. Breakfast - low sugar oatmeal, Greek yogurt with peanut butter, or protein coffee. Lunch - cottage cheese with spinach or deli turkey. Dinner - tuna fish, 1/2 veggie burger, turkey balls with 1-2 spoons of cauliflower rice. Snack - string cheese. Recommend adding CHO and fruits to her diet.\par Fluid intake is adequate with mainly water. She often drinks ~ with 150 ounces daily.\par She is walking, jogging or doing a [employer] camp class for 45-60 minutes 5-6 days/week with a  at the park.\par \par

## 2020-10-20 NOTE — HISTORY OF PRESENT ILLNESS
[Procedure: ___] : Procedure performed: [unfilled]  [Pre-Op Weight ___] : Pre-op weight was [unfilled] lbs [Date of Surgery: ___] : Date of Surgery:   [unfilled] [___ Years Post Op] : [unfilled] years [de-identified] : Patient had surgery approximately 1 year ago. She is c/o cystic acne and she has a follow up appointment with dermatology. \par She denies heartburn/reflux/vomiting and food intolerance. Constipation improved with smooth move teas.\par  [de-identified] : \par The visit was provided via Telehealth using real-time 1 way audio technology. The patient XAVIER HARKINS was located at home [address] [place] [address], [place] [address] at the time of the visit. \par The provider, RSOAURA CISNEROS, was located at the medical office at [address] [place] [address] at the time of the visit. The patient XAVIER HARKINS and provider participated in the telehealth encounter. Verbal consent was given on 8/4/2020 by the patient XAVIER HARKINS , self.\par

## 2021-04-09 ENCOUNTER — NON-APPOINTMENT (OUTPATIENT)
Age: 31
End: 2021-04-09

## 2021-05-12 ENCOUNTER — NON-APPOINTMENT (OUTPATIENT)
Age: 31
End: 2021-05-12

## 2021-05-14 ENCOUNTER — APPOINTMENT (OUTPATIENT)
Dept: PLASTIC SURGERY | Facility: CLINIC | Age: 31
End: 2021-05-14
Payer: MEDICAID

## 2021-05-14 VITALS — BODY MASS INDEX: 29.53 KG/M2 | HEIGHT: 64 IN | WEIGHT: 173 LBS

## 2021-05-14 PROCEDURE — 99203 OFFICE O/P NEW LOW 30 MIN: CPT

## 2021-05-14 PROCEDURE — 99072 ADDL SUPL MATRL&STAF TM PHE: CPT

## 2021-05-14 NOTE — ASSESSMENT
[FreeTextEntry1] : 29 yo F with h/o morbid obesity with MWL s/p sleeve gastrectomy who is a good candidate for body contouring surgery.  Grade III hanging abdominal pannus over BL hip and thighs.\par \par -Recommend Courtney de Lis extended panniculectomy to address horizontal and vertical pannus\par \par -Regarding the procedure, the discussed the benefits, risks, and outcomes. I explained the risk of bleeding, infection, hematoma, seroma, poor wound healing, wound separation, fat and/or tissue necrosis, hypertrophic scar/keloid formation, umbilicus ischemia, risk of VTE and possible pulmonary embolism, and need for re-admission, and dissatisfaction with the outcome. I also discussed the anticipated scar locations (hip-to-hip; vertical midline; periumbilical), use of surgical drains, and need for post-operative use of an abdominal binder and lifting restrictions after surgery.\par -She understands the supra-umbilical striae is not addressed with abdominoplasty.\par -Patient understood option of panniculectomy vs Courtney de Lis panniculectomy.  Pt elected to undergo Courtney de Lis extended panniculectomy\par -The patient understand the risks and post-operative expectations.\par -All the patient's questions were answered to her apparent satisfaction. Informed consent was obtained.\par -Pre-op photos were taken\par -Will schedule for outpatient BUTCH procedure under GA\par \par Photos were taken\par \par Due to COVID-19, pre-visit patient instructions were explained to the patient and their symptoms were checked upon arrival. Masks were used by the healthcare provider and staff and the examination room was cleaned after the patient visit concluded\par \par

## 2021-05-14 NOTE — PHYSICAL EXAM
[de-identified] : well developed pleasant female, NAD [de-identified] : NC/AT [de-identified] : PERRL [de-identified] : supple [de-identified] : unlabored breathing  [de-identified] : TONOR [de-identified] : abdominal pannus with horizontal and vertical skin laxity hanging over hip and thigh, extensive striae; poor skin tone and elasticity\par no palpable hernias, no significant rectus diastasis. [de-identified] : FROM x4

## 2021-05-14 NOTE — PHYSICAL EXAM
[de-identified] : well developed pleasant female, NAD [de-identified] : NC/AT [de-identified] : PERRL [de-identified] : supple [de-identified] : unlabored breathing  [de-identified] : TONOR [de-identified] : abdominal pannus with horizontal and vertical skin laxity hanging over hip and thigh, extensive striae; poor skin tone and elasticity\par no palpable hernias, no significant rectus diastasis. [de-identified] : FROM x4

## 2021-05-14 NOTE — ASSESSMENT
[FreeTextEntry1] : 31 yo F with h/o morbid obesity with MWL s/p sleeve gastrectomy who is a good candidate for body contouring surgery.  Grade III hanging abdominal pannus over BL hip and thighs.\par \par -Recommend Courtney de Lis extended panniculectomy to address horizontal and vertical pannus\par \par -Regarding the procedure, the discussed the benefits, risks, and outcomes. I explained the risk of bleeding, infection, hematoma, seroma, poor wound healing, wound separation, fat and/or tissue necrosis, hypertrophic scar/keloid formation, umbilicus ischemia, risk of VTE and possible pulmonary embolism, and need for re-admission, and dissatisfaction with the outcome. I also discussed the anticipated scar locations (hip-to-hip; vertical midline; periumbilical), use of surgical drains, and need for post-operative use of an abdominal binder and lifting restrictions after surgery.\par -She understands the supra-umbilical striae is not addressed with abdominoplasty.\par -Patient understood option of panniculectomy vs Courtney de Lis panniculectomy.  Pt elected to undergo Courtney de Lis extended panniculectomy\par -The patient understand the risks and post-operative expectations.\par -All the patient's questions were answered to her apparent satisfaction. Informed consent was obtained.\par -Pre-op photos were taken\par -Will schedule for outpatient BUTCH procedure under GA\par \par Photos were taken\par \par Due to COVID-19, pre-visit patient instructions were explained to the patient and their symptoms were checked upon arrival. Masks were used by the healthcare provider and staff and the examination room was cleaned after the patient visit concluded\par \par

## 2021-05-14 NOTE — HISTORY OF PRESENT ILLNESS
[FreeTextEntry1] : 29 yo F with PMHx of morbid obesity, vertigo (BPPV), cystic acne and benign heart murmur who presents today for panniculectomy consultation. Patient's max weight was 310lbs before undergoing sleeve gastrectomy with Dr. Sage in 2019 now at 173 lbs stable for 1 year c/o abdominal pannus and skin redundancy hanging below pubis causing lower back pain, frequent skin rash treated multiple times with antifungal and antibiotics remedies and difficulty with activities of daily living including ambulation. Patient has to hold her pannus when using the restroom and wears compressive garments daily due to discomfort. \par Denies any h/o DVT or MRSA skin infections. \par \par Here to discuss panniculectomy.\par \par Occupation - unemployed, stay at home mom \par Nonsmoker \par

## 2021-09-02 ENCOUNTER — OUTPATIENT (OUTPATIENT)
Dept: OUTPATIENT SERVICES | Facility: HOSPITAL | Age: 31
LOS: 1 days | Discharge: HOME | End: 2021-09-02
Payer: MEDICAID

## 2021-09-02 VITALS
WEIGHT: 171.96 LBS | TEMPERATURE: 98 F | HEIGHT: 64 IN | OXYGEN SATURATION: 99 % | DIASTOLIC BLOOD PRESSURE: 72 MMHG | HEART RATE: 76 BPM | RESPIRATION RATE: 16 BRPM | SYSTOLIC BLOOD PRESSURE: 104 MMHG

## 2021-09-02 DIAGNOSIS — Z01.818 ENCOUNTER FOR OTHER PREPROCEDURAL EXAMINATION: ICD-10-CM

## 2021-09-02 DIAGNOSIS — E66.9 OBESITY, UNSPECIFIED: ICD-10-CM

## 2021-09-02 DIAGNOSIS — Z98.890 OTHER SPECIFIED POSTPROCEDURAL STATES: Chronic | ICD-10-CM

## 2021-09-02 DIAGNOSIS — Z98.84 BARIATRIC SURGERY STATUS: Chronic | ICD-10-CM

## 2021-09-02 DIAGNOSIS — K08.409 PARTIAL LOSS OF TEETH, UNSPECIFIED CAUSE, UNSPECIFIED CLASS: Chronic | ICD-10-CM

## 2021-09-02 LAB
ALBUMIN SERPL ELPH-MCNC: 4.7 G/DL — SIGNIFICANT CHANGE UP (ref 3.5–5.2)
ALP SERPL-CCNC: 58 U/L — SIGNIFICANT CHANGE UP (ref 30–115)
ALT FLD-CCNC: 22 U/L — SIGNIFICANT CHANGE UP (ref 0–41)
ANION GAP SERPL CALC-SCNC: 13 MMOL/L — SIGNIFICANT CHANGE UP (ref 7–14)
AST SERPL-CCNC: 26 U/L — SIGNIFICANT CHANGE UP (ref 0–41)
BASOPHILS # BLD AUTO: 0.06 K/UL — SIGNIFICANT CHANGE UP (ref 0–0.2)
BASOPHILS NFR BLD AUTO: 1 % — SIGNIFICANT CHANGE UP (ref 0–1)
BILIRUB SERPL-MCNC: 0.3 MG/DL — SIGNIFICANT CHANGE UP (ref 0.2–1.2)
BUN SERPL-MCNC: 23 MG/DL — HIGH (ref 10–20)
CALCIUM SERPL-MCNC: 9.5 MG/DL — SIGNIFICANT CHANGE UP (ref 8.5–10.1)
CHLORIDE SERPL-SCNC: 101 MMOL/L — SIGNIFICANT CHANGE UP (ref 98–110)
CO2 SERPL-SCNC: 25 MMOL/L — SIGNIFICANT CHANGE UP (ref 17–32)
CREAT SERPL-MCNC: 0.8 MG/DL — SIGNIFICANT CHANGE UP (ref 0.7–1.5)
EOSINOPHIL # BLD AUTO: 0.12 K/UL — SIGNIFICANT CHANGE UP (ref 0–0.7)
EOSINOPHIL NFR BLD AUTO: 2.1 % — SIGNIFICANT CHANGE UP (ref 0–8)
GLUCOSE SERPL-MCNC: 72 MG/DL — SIGNIFICANT CHANGE UP (ref 70–99)
HCT VFR BLD CALC: 41.9 % — SIGNIFICANT CHANGE UP (ref 37–47)
HGB BLD-MCNC: 13.6 G/DL — SIGNIFICANT CHANGE UP (ref 12–16)
IMM GRANULOCYTES NFR BLD AUTO: 0.2 % — SIGNIFICANT CHANGE UP (ref 0.1–0.3)
LYMPHOCYTES # BLD AUTO: 2.13 K/UL — SIGNIFICANT CHANGE UP (ref 1.2–3.4)
LYMPHOCYTES # BLD AUTO: 36.9 % — SIGNIFICANT CHANGE UP (ref 20.5–51.1)
MCHC RBC-ENTMCNC: 29.1 PG — SIGNIFICANT CHANGE UP (ref 27–31)
MCHC RBC-ENTMCNC: 32.5 G/DL — SIGNIFICANT CHANGE UP (ref 32–37)
MCV RBC AUTO: 89.5 FL — SIGNIFICANT CHANGE UP (ref 81–99)
MONOCYTES # BLD AUTO: 0.46 K/UL — SIGNIFICANT CHANGE UP (ref 0.1–0.6)
MONOCYTES NFR BLD AUTO: 8 % — SIGNIFICANT CHANGE UP (ref 1.7–9.3)
NEUTROPHILS # BLD AUTO: 2.99 K/UL — SIGNIFICANT CHANGE UP (ref 1.4–6.5)
NEUTROPHILS NFR BLD AUTO: 51.8 % — SIGNIFICANT CHANGE UP (ref 42.2–75.2)
NRBC # BLD: 0 /100 WBCS — SIGNIFICANT CHANGE UP (ref 0–0)
PLATELET # BLD AUTO: 254 K/UL — SIGNIFICANT CHANGE UP (ref 130–400)
POTASSIUM SERPL-MCNC: 4.7 MMOL/L — SIGNIFICANT CHANGE UP (ref 3.5–5)
POTASSIUM SERPL-SCNC: 4.7 MMOL/L — SIGNIFICANT CHANGE UP (ref 3.5–5)
PROT SERPL-MCNC: 7.3 G/DL — SIGNIFICANT CHANGE UP (ref 6–8)
RBC # BLD: 4.68 M/UL — SIGNIFICANT CHANGE UP (ref 4.2–5.4)
RBC # FLD: 12.6 % — SIGNIFICANT CHANGE UP (ref 11.5–14.5)
SODIUM SERPL-SCNC: 139 MMOL/L — SIGNIFICANT CHANGE UP (ref 135–146)
WBC # BLD: 5.77 K/UL — SIGNIFICANT CHANGE UP (ref 4.8–10.8)
WBC # FLD AUTO: 5.77 K/UL — SIGNIFICANT CHANGE UP (ref 4.8–10.8)

## 2021-09-02 PROCEDURE — 93010 ELECTROCARDIOGRAM REPORT: CPT

## 2021-09-02 RX ORDER — BENZOYL PEROXIDE MICRONIZED 5.8 %
1 TOWELETTE (EA) TOPICAL
Qty: 0 | Refills: 0 | DISCHARGE

## 2021-09-02 NOTE — H&P PST ADULT - NSICDXPASTSURGICALHX_GEN_ALL_CORE_FT
PAST SURGICAL HISTORY:  H/O gastric bypass 10/19   wght loss to date -    S/P endoscopy     [place] teeth extracted      PAST SURGICAL HISTORY:  H/O gastric bypass 10/19   wght loss to date - 140lbs    S/P endoscopy     [place] teeth extracted

## 2021-09-02 NOTE — H&P PST ADULT - NSICDXNOFAMILYHX_GEN_ALL_CORE
Health Maintenance Summary     Topic Due On Due Status Completed On Postpone Until Reason    MAMMOGRAM - BREAST CANCER SCREENING Sep 11, 2017 Not Due Sep 11, 2015      Colorectal Cancer Screening - Colonoscopy Jul 8, 2025 Not Due Jul 8, 2015      Pap Smear - Cervical Cancer Screening  Jan 9, 1985 Postponed  Jan 6, 2017 Patient Refused    Immunization - Td/Tdap Oct 31, 2018 Not Due Oct 31, 2008      Immunization-Zoster  Completed Sep 29, 2015      Immunization - TDAP Pregnancy  Hidden       Immunization-Influenza  Completed Oct 13, 2016            Patient is due for topics as listed above, she wishes to decline at this time .       <-- Click to add NO pertinent Family History

## 2021-09-02 NOTE — H&P PST ADULT - DOES PATIENT HAVE ADVANCE DIRECTIVE
"Chief Complaint   Patient presents with     Cough     4 days        Initial There were no vitals taken for this visit. Estimated body mass index is 27.11 kg/(m^2) as calculated from the following:    Height as of 4/30/17: 5' 11\" (1.803 m).    Weight as of 6/5/17: 194 lb 6.4 oz (88.2 kg).  Medication Reconciliation: complete    Health Maintenance that is potentially due pending provider review:  NONE    Possibly completing today per provider review.    Is there anyone who you would like to be able to receive your results? No  If yes have patient fill out MIRNA    " No

## 2021-09-02 NOTE — H&P PST ADULT - HISTORY OF PRESENT ILLNESS
29 Y/O FEMALE PRESENTS TO PAST WITH HX MORBID OBESITY. PT C/O REDUNDANT SKIN ABD S/P GASTRIC BYPASS 10/19. PT STATES ABD FEELS UNCOMFORTABLE PT NOW FOR SCHEDULED PROCEDURE ( ZARIA MCDOWELL PANNICULECTOMY ) . PT DENIES ANY CP SOB PALP COUGH DYSURIA FEVER URI. PT ABLE TO OMAIRA 1-2 FOS W/O SOB  pt denies any covid s/s, or tested positive in the past  pt advised self quarantine till day of procedure  Anesthesia Alert  NO--Difficult Airway  NO--History of neck surgery or radiation  NO--Limited ROM of neck  NO--History of Malignant hyperthermia  NO--Personal or family history of Pseudocholinesterase deficiency.  NO--Prior Anesthesia Complication  NO--Latex Allergy  NO--Loose teeth  NO--History of Rheumatoid Arthritis  NO--STEPHEN  NO--Bleeding risk  NO--Other_____

## 2021-09-08 ENCOUNTER — NON-APPOINTMENT (OUTPATIENT)
Age: 31
End: 2021-09-08

## 2021-09-13 DIAGNOSIS — G89.18 OTHER ACUTE POSTPROCEDURAL PAIN: ICD-10-CM

## 2021-09-20 ENCOUNTER — OUTPATIENT (OUTPATIENT)
Dept: OUTPATIENT SERVICES | Facility: HOSPITAL | Age: 31
LOS: 1 days | Discharge: HOME | End: 2021-09-20

## 2021-09-20 ENCOUNTER — LABORATORY RESULT (OUTPATIENT)
Age: 31
End: 2021-09-20

## 2021-09-20 DIAGNOSIS — K08.409 PARTIAL LOSS OF TEETH, UNSPECIFIED CAUSE, UNSPECIFIED CLASS: Chronic | ICD-10-CM

## 2021-09-20 DIAGNOSIS — Z11.59 ENCOUNTER FOR SCREENING FOR OTHER VIRAL DISEASES: ICD-10-CM

## 2021-09-20 DIAGNOSIS — Z98.84 BARIATRIC SURGERY STATUS: Chronic | ICD-10-CM

## 2021-09-20 DIAGNOSIS — Z98.890 OTHER SPECIFIED POSTPROCEDURAL STATES: Chronic | ICD-10-CM

## 2021-09-22 NOTE — ASU PATIENT PROFILE, ADULT - NSICDXPASTSURGICALHX_GEN_ALL_CORE_FT
PAST SURGICAL HISTORY:  H/O gastric bypass 10/19   wght loss to date - 140lbs    S/P endoscopy     [place] teeth extracted

## 2021-09-23 ENCOUNTER — OUTPATIENT (OUTPATIENT)
Dept: OUTPATIENT SERVICES | Facility: HOSPITAL | Age: 31
LOS: 1 days | Discharge: HOME | End: 2021-09-23
Payer: MEDICAID

## 2021-09-23 ENCOUNTER — RESULT REVIEW (OUTPATIENT)
Age: 31
End: 2021-09-23

## 2021-09-23 ENCOUNTER — APPOINTMENT (OUTPATIENT)
Dept: PLASTIC SURGERY | Facility: AMBULATORY SURGERY CENTER | Age: 31
End: 2021-09-23
Payer: MEDICAID

## 2021-09-23 VITALS
RESPIRATION RATE: 18 BRPM | WEIGHT: 171.96 LBS | HEART RATE: 70 BPM | HEIGHT: 64 IN | SYSTOLIC BLOOD PRESSURE: 112 MMHG | OXYGEN SATURATION: 100 % | TEMPERATURE: 99 F | DIASTOLIC BLOOD PRESSURE: 57 MMHG

## 2021-09-23 VITALS
HEART RATE: 67 BPM | DIASTOLIC BLOOD PRESSURE: 53 MMHG | OXYGEN SATURATION: 97 % | RESPIRATION RATE: 16 BRPM | SYSTOLIC BLOOD PRESSURE: 94 MMHG

## 2021-09-23 DIAGNOSIS — Z98.84 BARIATRIC SURGERY STATUS: Chronic | ICD-10-CM

## 2021-09-23 DIAGNOSIS — Z98.890 OTHER SPECIFIED POSTPROCEDURAL STATES: Chronic | ICD-10-CM

## 2021-09-23 DIAGNOSIS — K08.409 PARTIAL LOSS OF TEETH, UNSPECIFIED CAUSE, UNSPECIFIED CLASS: Chronic | ICD-10-CM

## 2021-09-23 PROCEDURE — 15830 EXC EXCESSIVE SKIN ABDOMEN: CPT

## 2021-09-23 PROCEDURE — 88302 TISSUE EXAM BY PATHOLOGIST: CPT | Mod: 26

## 2021-09-23 PROCEDURE — 15834 EXC EXCESSIVE SKIN HIP: CPT | Mod: 50

## 2021-09-23 RX ORDER — SODIUM CHLORIDE 9 MG/ML
1000 INJECTION, SOLUTION INTRAVENOUS
Refills: 0 | Status: DISCONTINUED | OUTPATIENT
Start: 2021-09-23 | End: 2021-10-07

## 2021-09-23 RX ORDER — GABAPENTIN 400 MG/1
1 CAPSULE ORAL
Qty: 30 | Refills: 0
Start: 2021-09-23 | End: 2021-10-02

## 2021-09-23 RX ORDER — HYDROMORPHONE HYDROCHLORIDE 2 MG/ML
0.5 INJECTION INTRAMUSCULAR; INTRAVENOUS; SUBCUTANEOUS
Refills: 0 | Status: DISCONTINUED | OUTPATIENT
Start: 2021-09-23 | End: 2021-09-23

## 2021-09-23 RX ORDER — OXYCODONE HYDROCHLORIDE 5 MG/1
1 TABLET ORAL
Qty: 16 | Refills: 0
Start: 2021-09-23 | End: 2021-09-26

## 2021-09-23 RX ORDER — ONDANSETRON 8 MG/1
1 TABLET, FILM COATED ORAL
Qty: 6 | Refills: 0
Start: 2021-09-23 | End: 2021-09-24

## 2021-09-23 RX ORDER — OXYCODONE AND ACETAMINOPHEN 5; 325 MG/1; MG/1
1 TABLET ORAL EVERY 4 HOURS
Refills: 0 | Status: DISCONTINUED | OUTPATIENT
Start: 2021-09-23 | End: 2021-09-23

## 2021-09-23 RX ORDER — ONDANSETRON 8 MG/1
4 TABLET, FILM COATED ORAL ONCE
Refills: 0 | Status: DISCONTINUED | OUTPATIENT
Start: 2021-09-23 | End: 2021-10-07

## 2021-09-23 RX ORDER — HEPARIN SODIUM 5000 [USP'U]/ML
5000 INJECTION INTRAVENOUS; SUBCUTANEOUS ONCE
Refills: 0 | Status: COMPLETED | OUTPATIENT
Start: 2021-09-23 | End: 2021-09-23

## 2021-09-23 RX ADMIN — HEPARIN SODIUM 5000 UNIT(S): 5000 INJECTION INTRAVENOUS; SUBCUTANEOUS at 09:56

## 2021-09-23 RX ADMIN — SODIUM CHLORIDE 100 MILLILITER(S): 9 INJECTION, SOLUTION INTRAVENOUS at 15:46

## 2021-09-23 NOTE — BRIEF OPERATIVE NOTE - NSICDXBRIEFPOSTOP_GEN_ALL_CORE_FT
POST-OP DIAGNOSIS:  Abdominal pannus 23-Sep-2021 15:07:42  Ela Barfield  Obesity 23-Sep-2021 15:07:38 s/p MWL following sleeve gastrectomy Ela Barfield

## 2021-09-23 NOTE — ASU DISCHARGE PLAN (ADULT/PEDIATRIC) - CARE PROVIDER_API CALL
Ela Barfield)  [facility]  [address] [place] [address], [address]  [place] [address]  Phone: [phone]  Fax: [phone]  Scheduled Appointment: 09/24/2021

## 2021-09-23 NOTE — BRIEF OPERATIVE NOTE - NSICDXBRIEFPREOP_GEN_ALL_CORE_FT
PRE-OP DIAGNOSIS:  Obesity 23-Sep-2021 15:07:25 s/p MWL following sleeve gastrectomy Ela Barfield  Abdominal pannus 23-Sep-2021 15:07:34  Ela Barfield

## 2021-09-23 NOTE — BRIEF OPERATIVE NOTE - NSICDXBRIEFPROCEDURE_GEN_ALL_CORE_FT
PROCEDURES:  Panniculectomy, abdominal 23-Sep-2021 15:06:39 Courtney Encarnacion extended panniculectomy Ela Barfield

## 2021-09-23 NOTE — ASU DISCHARGE PLAN (ADULT/PEDIATRIC) - ASU DC SPECIAL INSTRUCTIONSFT
Diet: You may resume your usual diet. Avoid alcohol and excessive salt intake for two weeks following surgery. This will help to minimize swelling.    Medication: Take Gabapentin as prescribed, Tylenol 650mg every 6 hours around the clock for three days then as needed for pain. If pain is still not well controlled, take Oxycodone 5 mg every 6 hours as needed. Please be aware, the medication can cause drowsiness, so reserve for night time use or severe pain. Take all antibiotics as prescribed. Take Zofran as needed for nausea. Remember, no Aspirin or NSAIDS (Advil, Motrin, Aleve) as they may increase bruising. Your medication have been sent to your pharmacy. Please continue your own home medications as previously prescribed. Contact your primary care provider with any questions.    Activity: Start walking as soon as possible to increase circulation and prevent blood clots. You may take care of your personal needs as desired, however, no lifting or strenuous activity is allowed. Driving is not permitted.       Wound care: Keep your dressing clean, dry, and intact until seen by MD/PA. Wear the abdominal binder at all times. Do not smoke! It delays wound healing and increases the risk of complications. Use incentive spirometer every hour for ten times    Do not get the operative area wet. You are allowed to sponge bathe. No tub soaking. Please sleep with head elevated, you may put two pillows behind your back     Things to expect: The operative area may be bruised, swollen, and painful. You may also have temporary numbness which will improve over time.    Please plan to follow up  with Dr. Barfield tomorrow 09/24. Contact the office to confirm appointment. The phone number and address are available within discharge paperwork.  Please call the office or return to Emergency Department if you experience fever, shortness of breath, increasing abdominal pain, vomiting.    We wish you a pleasant recovery. Diet: You may resume your usual diet. Avoid alcohol and excessive salt intake for two weeks following surgery. This will help to minimize swelling.    Medication: Take Gabapentin as prescribed, Tylenol 650mg every 6 hours around the clock for three days then as needed for pain. If pain is still not well controlled, take Oxycodone 5 mg every 6 hours as needed. Please be aware, the medication can cause drowsiness, so reserve for night time use or severe pain. Take all antibiotics as prescribed. Take Zofran as needed for nausea. Remember, no Aspirin or NSAIDS (Advil, Motrin, Aleve) as they may increase bruising. Your medication have been sent to your pharmacy. Please continue your own home medications as previously prescribed. Contact your primary care provider with any questions.    Activity: Start walking as soon as possible to increase circulation and prevent blood clots. You may take care of your personal needs as desired, however, no lifting or strenuous activity is allowed. Driving is not permitted.       Wound care: Keep your dressing clean, dry, and intact until seen by MD/PA. Wear the abdominal binder at all times. Do not smoke! It delays wound healing and increases the risk of complications. Use incentive spirometer every hour for ten times. Keep drains on suction at all times. Please monitor and record drain output daily. Bring a written log with you to your postoperative appointment    Do not get the operative area wet. You are allowed to sponge bathe. No tub soaking. Please sleep with head elevated, you may put two pillows behind your back     Things to expect: The operative area may be bruised, swollen, and painful. You may also have temporary numbness which will improve over time.    Please plan to follow up  with Dr. Barfield tomorrow 09/24. Contact the office to confirm appointment. The phone number and address are available within discharge paperwork.  Please call the office or return to Emergency Department if you experience fever, shortness of breath, increasing abdominal pain, vomiting.    We wish you a pleasant recovery.

## 2021-09-23 NOTE — ASU DISCHARGE PLAN (ADULT/PEDIATRIC) - PATIENT EDUCATION MATERIALS PROVIED
pain management/Provider pre-printed instructions given/Pre-printed instructions given for drain care/Pre-printed instructions given for other (specify)

## 2021-09-24 ENCOUNTER — APPOINTMENT (OUTPATIENT)
Dept: PLASTIC SURGERY | Facility: CLINIC | Age: 31
End: 2021-09-24
Payer: MEDICAID

## 2021-09-24 PROCEDURE — 99024 POSTOP FOLLOW-UP VISIT: CPT

## 2021-09-24 NOTE — HISTORY OF PRESENT ILLNESS
[FreeTextEntry1] : 29 yo F with PMHx of morbid obesity, vertigo (BPPV), cystic acne and benign heart murmur who presents today for panniculectomy consultation. Patient's max weight was 310lbs before undergoing sleeve gastrectomy with Dr. Sage in 2019 now at 173 lbs stable for 1 year c/o abdominal pannus and skin redundancy hanging below pubis causing lower back pain, frequent skin rash treated multiple times with antifungal and antibiotics remedies and difficulty with activities of daily living including ambulation. Patient has to hold her pannus when using the restroom and wears compressive garments daily due to discomfort. \par Denies any h/o DVT or MRSA skin infections. \par \par Here to discuss panniculectomy.\par \par Occupation - unemployed, stay at home mom \par Nonsmoker \par \par Interval hx (9/24/21). Patient presents today POD#1 s/p Courtney de-lis panniculectomy with muscle plication. Doing very well with adequate analgesia, not requiring Tramadol. Ambulating without difficulty. Taking oral antibiotics and performing drain care as instructed. Denies any f/c, SOB or bleeding. Drains functional with appropriate output.

## 2021-09-24 NOTE — PHYSICAL EXAM
[de-identified] : well developed pleasant female, NAD [de-identified] : unlabored breathing  [de-identified] : vertical and horizontal abdominal incisions healing well, c/d/i, umbilicus viable, excellent contour, drains functional with ss output  [de-identified] : TONOR

## 2021-09-24 NOTE — ASSESSMENT
[FreeTextEntry1] : 29 yo F with h/o morbid obesity with MWL s/p sleeve gastrectomy who is a good candidate for body contouring surgery.  Grade III hanging abdominal pannus over BL hip and thighs.\par \par Now POD#1 s/p Courtneyclover Encarnacion extended panniculectomy with muscle plication. Doing very well.\par \par - continue drains and abdominal binder\par - continue oral antibiotics to completion\par - HOB elevation when in bed\par - OOB ad amira\par - post-op instructions discussed and all questions answered\par - f/u next week as scheduled \par \par Due to COVID-19, pre-visit patient instructions were explained to the patient and their symptoms were checked upon arrival. Masks were used by the healthcare provider and staff and the examination room was cleaned after the patient visit concluded\par \par

## 2021-09-26 ENCOUNTER — EMERGENCY (EMERGENCY)
Facility: HOSPITAL | Age: 31
LOS: 0 days | Discharge: HOME | End: 2021-09-26
Attending: STUDENT IN AN ORGANIZED HEALTH CARE EDUCATION/TRAINING PROGRAM | Admitting: STUDENT IN AN ORGANIZED HEALTH CARE EDUCATION/TRAINING PROGRAM
Payer: MEDICAID

## 2021-09-26 VITALS
OXYGEN SATURATION: 100 % | HEART RATE: 105 BPM | SYSTOLIC BLOOD PRESSURE: 151 MMHG | TEMPERATURE: 98 F | HEIGHT: 64 IN | DIASTOLIC BLOOD PRESSURE: 87 MMHG | RESPIRATION RATE: 18 BRPM

## 2021-09-26 DIAGNOSIS — Z98.84 BARIATRIC SURGERY STATUS: ICD-10-CM

## 2021-09-26 DIAGNOSIS — Z98.890 OTHER SPECIFIED POSTPROCEDURAL STATES: Chronic | ICD-10-CM

## 2021-09-26 DIAGNOSIS — Z48.03 ENCOUNTER FOR CHANGE OR REMOVAL OF DRAINS: ICD-10-CM

## 2021-09-26 DIAGNOSIS — K08.409 PARTIAL LOSS OF TEETH, UNSPECIFIED CAUSE, UNSPECIFIED CLASS: Chronic | ICD-10-CM

## 2021-09-26 DIAGNOSIS — E66.9 OBESITY, UNSPECIFIED: ICD-10-CM

## 2021-09-26 DIAGNOSIS — T88.9XXA COMPLICATION OF SURGICAL AND MEDICAL CARE, UNSPECIFIED, INITIAL ENCOUNTER: ICD-10-CM

## 2021-09-26 DIAGNOSIS — Z79.899 OTHER LONG TERM (CURRENT) DRUG THERAPY: ICD-10-CM

## 2021-09-26 DIAGNOSIS — Z98.84 BARIATRIC SURGERY STATUS: Chronic | ICD-10-CM
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
## 2021-09-26 PROCEDURE — 99283 EMERGENCY DEPT VISIT LOW MDM: CPT

## 2021-09-26 NOTE — ED PROVIDER NOTE - OBJECTIVE STATEMENT
30F PMHx morbid obesity s/p sleeve gastrectomy in 2019, s/p panniculectomy on 9/23/21 with Dr. Barfield presents for decreased JOSE drain output since this morning. Pt has 2 JOSE drains from surgery. Reports she emptied her drains at 10AM this morning and has not had output since. Reports she followed up in Dr. Barfield's office yesterday and was told to record her drain output, became concerned that they may be clogged. No fever/chills, cp/sob, abd pain, n/v/d.

## 2021-09-26 NOTE — ED PROVIDER NOTE - PATIENT PORTAL LINK FT
You can access the FollowMyHealth Patient Portal offered by [facility] by registering at the following website: [email or web address]. By joining [employer]’s FollowMyHealth portal, you will also be able to view your health information using other applications (apps) compatible with our system.

## 2021-09-26 NOTE — ED PROVIDER NOTE - ATTENDING CONTRIBUTION TO CARE
30-year-old female past medical history of recent sleeve gastrectomy, status post panniculectomy presents with decreased JOSE drain output.  Patient has 2 JOSE drains placed from her panniculectomy, states that there has only been mild serosanguineous fluid throughout the day today.  No purulent drainage, no fever/chills, no worsening or change in pain, no nausea/vomiting/diarrhea.    CONSTITUTIONAL: Well-developed; well-nourished; in no acute distress. Sitting up and providing appropriate history and physical examination  SKIN: skin exam is warm and dry, no acute rash.  HEAD: Normocephalic; atraumatic.  EYES: PERRL, 3 mm bilateral, no nystagmus, EOM intact; conjunctiva and sclera clear.  ENT: No nasal discharge; airway clear.  NECK: Supple; non tender. + full passive ROM in all directions. No JVD  CARD: S1, S2 normal; no murmurs, gallops, or rubs. Regular rate and rhythm. + Symmetric Strong Pulses  RESP: No wheezes, rales or rhonchi. Good air movement bilaterally  ABD: +bilateral JOSE drains with mild blood tinged fluid. No surrounding erythema at JOSE drain insertion sites, no purulent drainage. soft; non-distended; non-tender. No Rebound, No Guarding, No signs of peritonitis, No CVA tenderness. No pulsatile abdominal mass. + Strong and Symmetric Pulses  EXT: Normal ROM. No clubbing, cyanosis or edema. Dp and Pt Pulses intact. Cap refill less than 3 seconds  NEURO: CN 2-12 intact, normal finger to nose, normal romberg, stable gait, no sensory or motor deficits, Alert, oriented, grossly unremarkable. No Focal deficits. GCS 15. NIH 0  PSYCH: Cooperative, appropriate.

## 2021-09-26 NOTE — CONSULT NOTE ADULT - ASSESSMENT
ASSESSMENT:  This is a 30 year old female with a PMH/PSH of morbid obesity s/p laparoscopic sleeve gastrectomy (10/22/2019 by Dr. Sage, 140lbs weight loss), s/p carlos de lis extended panniculectomy (9/23/2021, Dr. Barfield) who presented to the ED with decreased JOSE drain output since this morning. Physical exam findings, imaging, and labs as documented above.     PLAN:  -No acute surgical intervention  -Drains stripped and functioning properly  -Continue to record JOSE outputs  -Follow-up in office as previously scheduled    Lines/Tubes: PIV.    Above plan discussed with Attending Surgeon Dr. Barfield, patient, patient family, and Primary team  09-26-21 @ 20:41 ASSESSMENT:  This is a 30 year old female with a PMH/PSH of morbid obesity s/p laparoscopic sleeve gastrectomy (10/22/2019 by Dr. Sage, 140lbs weight loss), s/p carlos de lis extended panniculectomy (9/23/2021, Dr. Barfield) who presented to the ED with decreased JOSE drain output since this morning. Physical exam findings, imaging, and labs as documented above.     PLAN:  -No acute surgical intervention  -Drains stripped and functioning properly  -Continue to record JOSE outputs  -Follow-up in office as previously scheduled    Lines/Tubes: PIV.    Above plan discussed with Attending Surgeon Dr. Barfield, patient, patient family, and Primary team  09-26-21 @ 20:41    Senior Note  I have personally examined and evaluated the patient  I agree with the above plan and note, and I have edited where appropriate  Drains SS, continued output, no obstruction or malfxn  Pt nontender, binder in place, dressings c/d/i. Pt following postoperative instructions and recovering uneventfully. No complaints.   Decrease in drain output is expected postoperative course, pt reassured  Surgical Attending aware and agrees with plan

## 2021-09-26 NOTE — ED PROVIDER NOTE - CARE PROVIDERS DIRECT ADDRESSES
[email or web address]
13-Feb-2019 12:58

## 2021-09-26 NOTE — ED PROVIDER NOTE - CARE PROVIDER_API CALL
Ela Barfield)  [facility]  [address] [place] [address], [address]  [place] [address]  Phone: [phone]  Fax: [phone]  Follow Up Time:

## 2021-09-26 NOTE — CONSULT NOTE ADULT - SUBJECTIVE AND OBJECTIVE BOX
GENERAL SURGERY CONSULT NOTE    Patient: XAVIER HARKINS , 30y ([birth year])Female   MRN: [id number]  Location: [facility] ED  Visit: 09-26-21 Emergency  Date: 09-26-21 @ 20:41    HPI:  This is a 30 year old female with a PMH/PSH of morbid obesity s/p laparoscopic sleeve gastrectomy (10/22/2019 by Dr. Sage, 140lbs weight loss), s/p carlos kapoor extended panniculectomy (9/23/2021, Dr. Barfield) who presented to the ED with decreased JOSE drain output since this morning. Patient states that she emptied and stripped her drains at approximately 10AM this morning and had no output since then. Reportedly drained 15cc from the right and 20cc from the left. States that she thought she saw something white in the drain near the skin insertion sight and became concerned, prompting her visit to the ED. States that she had her postop follow-up in the clinic yesterday without complication. Had been recording daily JOSE outputs bilaterally, endorsing approximately 20-30cc's bilaterally daily. Denies any fever, chills, redness, pain, or changes in characteristic of drain output. States that she has not performed any heavy lifting and has her next follow-up in the office on Thursday.    PAST MEDICAL & SURGICAL HISTORY:  Obesity  [place] teeth extracted  S/P endoscopy  H/O gastric bypass 10/19   wght loss to date - 140lbs    Home Medications:  biotin 5000 mcg oral capsule: 2  orally once a day (23 Sep 2021 08:50)  Citracal + D 315 mg-6.25 mcg (250 intl units) oral tablet: orally once a day (at bedtime) (23 Sep 2021 08:50)  ferrous sulfate 200 mg (65 mg elemental iron) oral tablet: 1 tab(s) orally once a day (23 Sep 2021 08:50)  ISOtretinoin 30 mg oral capsule: 1 cap(s) orally 2 times a day (with meals) (23 Sep 2021 08:50)  magnesium carbonate 250 mg oral capsule: orally once a day (23 Sep 2021 08:50)  Multiple Vitamins oral capsule: 1 cap(s) orally once a day (23 Sep 2021 08:50)  Probiotic Formula oral capsule: 1 cap(s) orally once a day (23 Sep 2021 08:50)  Turmeric 500 mg oral capsule: 1 cap(s) orally once a day (23 Sep 2021 08:50)  Vitamin B-100 oral tablet: 1 tab(s) orally once a day (23 Sep 2021 08:50)  Vitamin B12 500 mcg oral tablet: 1 tab(s) orally once a day (23 Sep 2021 08:50)  Vitamin D3 2000 intl units oral tablet: 1 tab(s) orally once a day (23 Sep 2021 08:50)  Zinc 140 mg (as elemental zinc 50 mg) oral tablet: 1 tab(s) orally once a day (23 Sep 2021 08:50)    VITALS:  T(F): 97.8 (09-26-21 @ 18:51), Max: 97.8 (09-26-21 @ 18:51)  HR: 105 (09-26-21 @ 18:51) (105 - 105)  BP: 151/87 (09-26-21 @ 18:51) (151/87 - 151/87)  RR: 18 (09-26-21 @ 18:51) (18 - 18)  SpO2: 100% (09-26-21 @ 18:51) (100% - 100%)    PHYSICAL EXAM:  General: NAD, AAOx3, calm and cooperative  Cardiac: RRR S1, S2, no Murmurs, rubs or gallops  Respiratory: CTAB, normal respiratory effort  Abdomen: Soft, non-distended, non-tender, no rebound, no guarding. Bilateral JOSE drains with serosanguinous output in bulbs, to suction.  Vascular: Pulses 2+ throughout, extremities well perfused.  Skin: Warm/dry, normal color, no jaundice  Incision/wound: abdominal incisions covered with dressings, scattered dried sanguinous output. Otherwise dry and intact.    MEDICATIONS  (STANDING):  MEDICATIONS  (PRN):    LAB/STUDIES:  None    IMAGING:  None    ACCESS DEVICES:  [ X ] Peripheral IV  [ ] Central Venous Line	[ ] R	[ ] L	[ ] IJ	[ ] Fem	[ ] SC	Placed:   [ ] Arterial Line		[ ] R	[ ] L	[ ] Fem	[ ] Rad	[ ] Ax	Placed:   [ ] PICC:					[ ] Mediport  [ ] Urinary Catheter, Date Placed:    GENERAL SURGERY CONSULT NOTE    Patient: XAVIER HARKINS , 30y ([birth year])Female   MRN: [id number]  Location: [facility] ED  Visit: 09-26-21 Emergency  Date: 09-26-21 @ 20:41    HPI:  This is a 30 year old female with a PMH/PSH of morbid obesity s/p laparoscopic sleeve gastrectomy (10/22/2019 by Dr. Sage, 140lbs weight loss), s/p carlos kapoor extended panniculectomy (9/23/2021, Dr. Barfield) who presented to the ED with decreased JOSE drain output since this morning. Patient states that she emptied and stripped her drains at approximately 10AM this morning and had no output since then. Reportedly drained 15cc from the right and 20cc from the left. States that she thought she saw something white in the drain near the skin insertion sight and became concerned, prompting her visit to the ED. States that she had her postop follow-up in the clinic yesterday without complication. Had been recording daily JOSE outputs bilaterally, endorsing approximately 20-30cc's bilaterally daily. Denies any fever, chills, redness, pain, or changes in characteristic of drain output. States that she has not performed any heavy lifting and has her next follow-up in the office on Thursday.    PAST MEDICAL & SURGICAL HISTORY:  Obesity  [place] teeth extracted  S/P endoscopy  H/O gastric bypass 10/19   wght loss to date - 140lbs    Home Medications:  biotin 5000 mcg oral capsule: 2  orally once a day (23 Sep 2021 08:50)  Citracal + D 315 mg-6.25 mcg (250 intl units) oral tablet: orally once a day (at bedtime) (23 Sep 2021 08:50)  ferrous sulfate 200 mg (65 mg elemental iron) oral tablet: 1 tab(s) orally once a day (23 Sep 2021 08:50)  ISOtretinoin 30 mg oral capsule: 1 cap(s) orally 2 times a day (with meals) (23 Sep 2021 08:50)  magnesium carbonate 250 mg oral capsule: orally once a day (23 Sep 2021 08:50)  Multiple Vitamins oral capsule: 1 cap(s) orally once a day (23 Sep 2021 08:50)  Probiotic Formula oral capsule: 1 cap(s) orally once a day (23 Sep 2021 08:50)  Turmeric 500 mg oral capsule: 1 cap(s) orally once a day (23 Sep 2021 08:50)  Vitamin B-100 oral tablet: 1 tab(s) orally once a day (23 Sep 2021 08:50)  Vitamin B12 500 mcg oral tablet: 1 tab(s) orally once a day (23 Sep 2021 08:50)  Vitamin D3 2000 intl units oral tablet: 1 tab(s) orally once a day (23 Sep 2021 08:50)  Zinc 140 mg (as elemental zinc 50 mg) oral tablet: 1 tab(s) orally once a day (23 Sep 2021 08:50)    VITALS:  T(F): 97.8 (09-26-21 @ 18:51), Max: 97.8 (09-26-21 @ 18:51)  HR: 105 (09-26-21 @ 18:51) (105 - 105)  BP: 151/87 (09-26-21 @ 18:51) (151/87 - 151/87)  RR: 18 (09-26-21 @ 18:51) (18 - 18)  SpO2: 100% (09-26-21 @ 18:51) (100% - 100%)    PHYSICAL EXAM:  General: NAD, AAOx3, calm and cooperative  Cardiac: RRR S1, S2, no Murmurs, rubs or gallops  Respiratory: CTAB, normal respiratory effort  Abdomen: Soft, non-distended, non-tender, no rebound, no guarding. Bilateral JOSE drains with serosanguinous output in bulbs, to suction.   Vascular: Pulses 2+ throughout, extremities well perfused.  Skin: Warm/dry, normal color, no jaundice  Incision/wound: abdominal incisions covered with dressings, scattered dried serosanguinous output. Otherwise clean, dry, and intact.    MEDICATIONS  (STANDING):  MEDICATIONS  (PRN):    LAB/STUDIES:  None    IMAGING:  None    ACCESS DEVICES:  [ X ] Peripheral IV  [ ] Central Venous Line	[ ] R	[ ] L	[ ] IJ	[ ] Fem	[ ] SC	Placed:   [ ] Arterial Line		[ ] R	[ ] L	[ ] Fem	[ ] Rad	[ ] Ax	Placed:   [ ] PICC:					[ ] Mediport  [ ] Urinary Catheter, Date Placed:

## 2021-09-26 NOTE — ED PROVIDER NOTE - CLINICAL SUMMARY MEDICAL DECISION MAKING FREE TEXT BOX
I personally evaluated the patient. I reviewed the Resident’s or Physician Assistant’s note (as assigned above), and agree with the findings and plan except as documented in my note. Patient evaluated for decreased JOSE drainage.  Surgery consulted, no need for intervention or treatment at this time.  Patient well-appearing, no acute change in pain or other complaints. I have fully discussed the medical management and delivery of care with the patient. I have discussed any available labs, imaging and treatment options with the patient. Patient confirms understanding and has been given detailed return precautions. Patient instructed to return to the ED should symptoms persist or worsen. Patient has demonstrated capacity and has verbalized understanding. Patient is well appearing upon discharge.

## 2021-09-26 NOTE — ED PROVIDER NOTE - NS ED ROS FT
Constitutional: (-) diaphoresis  Eyes/ENT: (-) runny nose  Cardiovascular: (-) chest pain  Respiratory: (-) cough  Gastrointestinal: (-) vomiting, (-) diarrhea. +see HPI  : (-) dysuria   Musculoskeletal: (-) joint pain  Integumentary: (-) rash  Neurological: (-) LOC  Allergic/Immunologic: (-) pruritus

## 2021-09-26 NOTE — ED PROVIDER NOTE - PHYSICAL EXAMINATION
VITAL SIGNS: I have reviewed nursing notes and confirm.  CONSTITUTIONAL: well-appearing, calm female, non-toxic, NAD  SKIN: Warm dry, normal skin turgor  HEAD: NCAT  EYES: EOMI, no scleral icterus  ENT: Moist mucous membranes  NECK: Supple; non tender. Full ROM. No cervical LAD  CARD: RRR, no murmurs, rubs or gallops  RESP: clear to ausculation b/l.  No rales, rhonchi, or wheezing.  ABD: soft, non-tender, non-distended, no rebound or guarding. abd binder in place. bilateral JOSE drains in place on R and L side of abd with ~15 sanguinous output in each.  EXT: Full ROM, no bony tenderness, no pedal edema, no calf tenderness  NEURO: Normal gait.  PSYCH: Cooperative, appropriate.

## 2021-09-26 NOTE — ED PROVIDER NOTE - NSFOLLOWUPINSTRUCTIONS_ED_ALL_ED_FT
Follow up in Dr. Barfield's office. Return to the ED if you develop fever, nausea, vomiting, or abdominal pain.

## 2021-09-28 LAB — SURGICAL PATHOLOGY STUDY: SIGNIFICANT CHANGE UP

## 2021-09-30 ENCOUNTER — APPOINTMENT (OUTPATIENT)
Dept: PLASTIC SURGERY | Facility: CLINIC | Age: 31
End: 2021-09-30
Payer: MEDICAID

## 2021-09-30 DIAGNOSIS — E65 LOCALIZED ADIPOSITY: ICD-10-CM

## 2021-09-30 DIAGNOSIS — L57.4 CUTIS LAXA SENILIS: ICD-10-CM

## 2021-09-30 DIAGNOSIS — Z98.84 BARIATRIC SURGERY STATUS: ICD-10-CM

## 2021-09-30 PROCEDURE — 99024 POSTOP FOLLOW-UP VISIT: CPT

## 2021-09-30 NOTE — HISTORY OF PRESENT ILLNESS
[FreeTextEntry1] : 31 yo F with PMHx of morbid obesity, vertigo (BPPV), cystic acne and benign heart murmur who presents today for panniculectomy consultation. Patient's max weight was 310lbs before undergoing sleeve gastrectomy with Dr. Sage in 2019 now at 173 lbs stable for 1 year c/o abdominal pannus and skin redundancy hanging below pubis causing lower back pain, frequent skin rash treated multiple times with antifungal and antibiotics remedies and difficulty with activities of daily living including ambulation. Patient has to hold her pannus when using the restroom and wears compressive garments daily due to discomfort. \par Denies any h/o DVT or MRSA skin infections. \par \par Here to discuss panniculectomy.\par \par Occupation - unemployed, stay at home mom \par Nonsmoker \par \par Interval hx (9/24/21). Patient presents today POD#1 s/p Courtney de-lis panniculectomy. Doing very well with adequate analgesia, not requiring Tramadol. Ambulating without difficulty. Taking oral antibiotics and performing drain care as instructed. Denies any f/c, SOB or bleeding. Drains functional with appropriate output. \par \par Interval hx (9/30/21). Patient presents today POD#7 s/p Courtney de-lis panniculectomy. Offers no significant  complaints. Denies any fever, chills or bleeding. Right drain 30/20/20, left drain 20/15/10.

## 2021-09-30 NOTE — DATA REVIEWED
[FreeTextEntry1] : \par  Pathology             Final\par \par No Documents Attached\par \par \par \par \par   [id number]\par Patient:   XAVIER HARKINS\par \par \par Accession:                             [id number]\par \par Collected Date/Time:                   9/23/2021 14:00 EDT\par Received Date/Time:                    9/23/2021 14:41 EDT\par \par Surgical Pathology Report - Auth (Verified)\par \par Specimen(s) Submitted\par Pannus\par \par Final Diagnosis\par Submitted as "pannus", site not specified, panniculectomy:\par - Skin and subcutaneous mature fibroadipose tissue consistent with\par panniculus.\par \par Verified by: Stephen Pizarro M.D\par (Electronic Signature)\par Reported on: 09/28/21 09:15 EDT, [address] [place] [address]\par Phone: [phone]   Fax: [phone]\par _________________________________________________________________\par \par Gross Description\par The specimen is received fresh, labeled "pannus" and consists of three\par \par connected fragments of skin and underlying subcutaneous tissue and\par fibroadipose tissue, weighing 3180 gm total, measuring 22.6 x 19 x 2.8\par cm, 16 x 10 x 3 cm, and 27 x 26 x 4 cm . The skin is tan. The specimen\par is serially sectioned revealing a cut surface of yellow adipose tissue.\par Representative sections are submitted. (5 blocks)\par \par Specimen was received and underwent gross examination at [facility], [address] [place] [address].\par \par 09/24/2021 09:21:17 EDT lm\par \par  \par \par  Ordered by: JANNY FARLEY       Collected/Examined: 23Sep2021 02:00PM       \par Verification Required       Stage: Final       \par  Performed at: [facility]       Resulted: 28Sep2021 09:15AM       Last Updated: 28Sep2021 09:16AM       Accession: [id number]

## 2021-09-30 NOTE — ASSESSMENT
[FreeTextEntry1] : 29 yo F with h/o morbid obesity with MWL s/p sleeve gastrectomy who is a good candidate for body contouring surgery.  Grade III hanging abdominal pannus over BL hip and thighs.\par \par Now POD#7 s/p Courtney Encarnacion extended panniculectomy. Doing very well.\par \par - left drain removed and all dressings changed\par - continue right drain and abdominal binder\par - HOB elevation when in bed\par - pathology discussed \par - post-op instructions discussed and all questions answered\par - f/u next week for drain and suture removal \par \par Due to COVID-19, pre-visit patient instructions were explained to the patient and their symptoms were checked upon arrival. Masks were used by the healthcare provider and staff and the examination room was cleaned after the patient visit concluded\par \par

## 2021-09-30 NOTE — PHYSICAL EXAM
[de-identified] : well developed pleasant female, NAD [de-identified] : unlabored breathing  [de-identified] : TONOR [de-identified] : vertical and horizontal abdominal incisions healing well, c/d/i, umbilicus viable, excellent contour, drains functional with ss output

## 2021-10-08 ENCOUNTER — APPOINTMENT (OUTPATIENT)
Dept: PLASTIC SURGERY | Facility: CLINIC | Age: 31
End: 2021-10-08
Payer: MEDICAID

## 2021-10-08 PROCEDURE — 99024 POSTOP FOLLOW-UP VISIT: CPT

## 2021-10-08 NOTE — HISTORY OF PRESENT ILLNESS
[FreeTextEntry1] : 31 yo F with PMHx of morbid obesity, vertigo (BPPV), cystic acne and benign heart murmur who presents today for panniculectomy consultation. Patient's max weight was 310lbs before undergoing sleeve gastrectomy with Dr. Sage in 2019 now at 173 lbs stable for 1 year c/o abdominal pannus and skin redundancy hanging below pubis causing lower back pain, frequent skin rash treated multiple times with antifungal and antibiotics remedies and difficulty with activities of daily living including ambulation. Patient has to hold her pannus when using the restroom and wears compressive garments daily due to discomfort. \par Denies any h/o DVT or MRSA skin infections. \par \par Here to discuss panniculectomy.\par \par Occupation - unemployed, stay at home mom \par Nonsmoker \par \par Interval hx (9/24/21). Patient presents today POD#1 s/p Courtney de-lis panniculectomy. Doing very well with adequate analgesia, not requiring Tramadol. Ambulating without difficulty. Taking oral antibiotics and performing drain care as instructed. Denies any f/c, SOB or bleeding. Drains functional with appropriate output. \par \par Interval hx (9/30/21). Patient presents today POD#7 s/p Courtney de-lis panniculectomy. Offers no significant  complaints. Denies any fever, chills or bleeding. Right drain 30/20/20, left drain 20/15/10.\par \par Interval hx (10/8/21):  here  POD#15 s/p s/p Courtney de-lis panniculectomy. Offers no significant  complaints. Denies fevers, chills, bleeding. Right drain 13/13/5.

## 2021-10-08 NOTE — PHYSICAL EXAM
[de-identified] : well developed pleasant female, NAD [de-identified] : unlabored breathing  [de-identified] : TONOR [de-identified] : vertical and horizontal abdominal incisions healing well, c/d/i, umbilicus viable, excellent contour, drain functional with ss output

## 2021-10-08 NOTE — ASSESSMENT
[FreeTextEntry1] : 31 yo F with h/o morbid obesity with MWL s/p sleeve gastrectomy who is a good candidate for body contouring surgery.  Grade III hanging abdominal pannus over BL hip and thighs.\par \par Now POD#15 s/p Courtneyclover Encarnacion extended panniculectomy. Doing very well.\par \par - Right drain removed and all dressings changed\par - Sutures removed\par - start scar cream to incisions\par - continue abdominal binder at all times\par - HOB elevation when in bed\par - pathology discussed - benign\par - post-op instructions discussed and all questions answered\par - f/u in 2 weeks rule out seroma s/p drain removal\par \par Due to COVID-19, pre-visit patient instructions were explained to the patient and their symptoms were checked upon arrival. Masks were used by the healthcare provider and staff and the examination room was cleaned after the patient visit concluded\par \par

## 2021-10-08 NOTE — DATA REVIEWED
[FreeTextEntry1] : \par  Pathology             Final\par \par No Documents Attached\par \par \par \par \par   [id number]\par Patient:   XAVIER HARKINS\par \par \par Accession:                             [id number]\par \par Collected Date/Time:                   9/23/2021 14:00 EDT\par Received Date/Time:                    9/23/2021 14:41 EDT\par \par Surgical Pathology Report - Auth (Verified)\par \par Specimen(s) Submitted\par Pannus\par \par Final Diagnosis\par Submitted as "pannus", site not specified, panniculectomy:\par - Skin and subcutaneous mature fibroadipose tissue consistent with\par panniculus.\par \par Verified by: Stephen Pizarro M.D\par (Electronic Signature)\par Reported on: 09/28/21 09:15 EDT, [address] [place] [address]\par Phone: [phone]   Fax: [phone]\par _________________________________________________________________\par \par Gross Description\par The specimen is received fresh, labeled "pannus" and consists of three\par \par connected fragments of skin and underlying subcutaneous tissue and\par fibroadipose tissue, weighing 3180 gm total, measuring 22.6 x 19 x 2.8\par cm, 16 x 10 x 3 cm, and 27 x 26 x 4 cm . The skin is tan. The specimen\par is serially sectioned revealing a cut surface of yellow adipose tissue.\par Representative sections are submitted. (5 blocks)\par \par Specimen was received and underwent gross examination at [facility], [address] [place] [address].\par \par 09/24/2021 09:21:17 EDT lm\par \par  \par \par  Ordered by: JANNY FARLEY       Collected/Examined: 23Sep2021 02:00PM       \par Verification Required       Stage: Final       \par  Performed at: [facility]       Resulted: 28Sep2021 09:15AM       Last Updated: 28Sep2021 09:16AM       Accession: [id number]

## 2021-10-22 ENCOUNTER — APPOINTMENT (OUTPATIENT)
Dept: PLASTIC SURGERY | Facility: CLINIC | Age: 31
End: 2021-10-22
Payer: MEDICAID

## 2021-10-22 PROCEDURE — 99024 POSTOP FOLLOW-UP VISIT: CPT

## 2021-10-22 NOTE — PHYSICAL EXAM
[de-identified] : well developed pleasant female, NAD [de-identified] : unlabored breathing  [de-identified] : TONOR [de-identified] : vertical and horizontal abdominal incisions healing well, c/d/i, umbilicus viable, excellent contour

## 2021-10-22 NOTE — DATA REVIEWED
[FreeTextEntry1] : \par  Pathology             Final\par \par No Documents Attached\par \par \par \par \par   [facility] [id number]\par Patient:   XAVIER HARKINS\par \par \par Accession:                             [id number]\par \par Collected Date/Time:                   9/23/2021 14:00 EDT\par Received Date/Time:                    9/23/2021 14:41 EDT\par \par Surgical Pathology Report - Auth (Verified)\par \par Specimen(s) Submitted\par Pannus\par \par Final Diagnosis\par Submitted as "pannus", site not specified, panniculectomy:\par - Skin and subcutaneous mature fibroadipose tissue consistent with\par panniculus.\par \par Verified by: Stephen Pizarro M.D\par (Electronic Signature)\par Reported on: 09/28/21 09:15 EDT, [address] [place] [address]\par Phone: [phone]   Fax: [phone]\par _________________________________________________________________\par \par Gross Description\par The specimen is received fresh, labeled "pannus" and consists of three\par \par connected fragments of skin and underlying subcutaneous tissue and\par fibroadipose tissue, weighing 3180 gm total, measuring 22.6 x 19 x 2.8\par cm, 16 x 10 x 3 cm, and 27 x 26 x 4 cm . The skin is tan. The specimen\par is serially sectioned revealing a cut surface of yellow adipose tissue.\par Representative sections are submitted. (5 blocks)\par \par Specimen was received and underwent gross examination at [facility], [address] [place] [address].\par \par 09/24/2021 09:21:17 EDT lm\par \par  \par \par  Ordered by: JANNY FARLEY       Collected/Examined: 23Sep2021 02:00PM       \par Verification Required       Stage: Final       \par  Performed at: [facility]       Resulted: 28Sep2021 09:15AM       Last Updated: 28Sep2021 09:16AM       Accession: [id number]

## 2021-10-22 NOTE — HISTORY OF PRESENT ILLNESS
[FreeTextEntry1] : 31 yo F with PMHx of morbid obesity, vertigo (BPPV), cystic acne and benign heart murmur who presents today for panniculectomy consultation. Patient's max weight was 310lbs before undergoing sleeve gastrectomy with Dr. Sage in 2019 now at 173 lbs stable for 1 year c/o abdominal pannus and skin redundancy hanging below pubis causing lower back pain, frequent skin rash treated multiple times with antifungal and antibiotics remedies and difficulty with activities of daily living including ambulation. Patient has to hold her pannus when using the restroom and wears compressive garments daily due to discomfort. \par Denies any h/o DVT or MRSA skin infections. \par \par Here to discuss panniculectomy.\par \par Occupation - unemployed, stay at home mom \par Nonsmoker \par \par Interval hx (9/24/21). Patient presents today POD#1 s/p Courtney de-lis panniculectomy. Doing very well with adequate analgesia, not requiring Tramadol. Ambulating without difficulty. Taking oral antibiotics and performing drain care as instructed. Denies any f/c, SOB or bleeding. Drains functional with appropriate output. \par \par Interval hx (9/30/21). Patient presents today POD#7 s/p Courtney de-lis panniculectomy. Offers no significant  complaints. Denies any fever, chills or bleeding. Right drain 30/20/20, left drain 20/15/10.\par \par Interval hx (10/8/21):  here  POD#15 s/p s/p Courtney de-lis panniculectomy. Offers no significant  complaints. Denies fevers, chills, bleeding. Right drain 13/13/5.\par \par Interval hx (10/22/21):  here  POD#29 s/p s/p Courtney de-lis panniculectomy. Offers no significant  complaints. Using scar cream and very happy with results.

## 2021-10-22 NOTE — ASSESSMENT
[FreeTextEntry1] : 31 yo F with h/o morbid obesity with MWL s/p sleeve gastrectomy who is a good candidate for body contouring surgery.  Grade III hanging abdominal pannus over BL hip and thighs.\par \par Now POD#29 s/p Courtney Encarnacion extended panniculectomy. Doing very well.\par \par - continue scar cream to incisions\par - continue abdominal binder at all times\par - HOB elevation, lay flat as tolerated\par - pathology discussed - benign\par - post-op instructions discussed and all questions answered\par - f/u in 2-3 weeks for activity clearance\par \par Due to COVID-19, pre-visit patient instructions were explained to the patient and their symptoms were checked upon arrival. Masks were used by the healthcare provider and staff and the examination room was cleaned after the patient visit concluded\par \par

## 2021-11-05 ENCOUNTER — APPOINTMENT (OUTPATIENT)
Dept: PLASTIC SURGERY | Facility: CLINIC | Age: 31
End: 2021-11-05
Payer: MEDICAID

## 2021-11-05 PROCEDURE — 99024 POSTOP FOLLOW-UP VISIT: CPT

## 2021-11-05 NOTE — HISTORY OF PRESENT ILLNESS
[FreeTextEntry1] : 31 yo F with PMHx of morbid obesity, vertigo (BPPV), cystic acne and benign heart murmur who presents today for panniculectomy consultation. Patient's max weight was 310lbs before undergoing sleeve gastrectomy with Dr. Sage in 2019 now at 173 lbs stable for 1 year c/o abdominal pannus and skin redundancy hanging below pubis causing lower back pain, frequent skin rash treated multiple times with antifungal and antibiotics remedies and difficulty with activities of daily living including ambulation. Patient has to hold her pannus when using the restroom and wears compressive garments daily due to discomfort. \par Denies any h/o DVT or MRSA skin infections. \par \par Here to discuss panniculectomy.\par \par Occupation - unemployed, stay at home mom \par Nonsmoker \par \par Interval hx (9/24/21). Patient presents today POD#1 s/p Courtney de-lis panniculectomy. Doing very well with adequate analgesia, not requiring Tramadol. Ambulating without difficulty. Taking oral antibiotics and performing drain care as instructed. Denies any f/c, SOB or bleeding. Drains functional with appropriate output. \par \par Interval hx (9/30/21). Patient presents today POD#7 s/p Courtney de-lis panniculectomy. Offers no significant  complaints. Denies any fever, chills or bleeding. Right drain 30/20/20, left drain 20/15/10.\par \par Interval hx (10/8/21):  here  POD#15 s/p s/p Courtney de-lis panniculectomy. Offers no significant  complaints. Denies fevers, chills, bleeding. Right drain 13/13/5.\par \par Interval hx (10/22/21):  here  POD#29 s/p s/p Courtney de-lis panniculectomy. Offers no significant  complaints. Using scar cream and very happy with results.\par \par Interval hx (11/5/21):  here  6 weeks s/p s/p Courtney de-lis panniculectomy. No complaints, except dog ear at xyphoid

## 2021-11-05 NOTE — DATA REVIEWED
[FreeTextEntry1] : \par  Pathology             Final\par \par No Documents Attached\par \par \par \par \par   [facility] [id number]\par Patient:   XAVIER HARKINS\par \par \par Accession:                             [id number]\par \par Collected Date/Time:                   9/23/2021 14:00 EDT\par Received Date/Time:                    9/23/2021 14:41 EDT\par \par Surgical Pathology Report - Auth (Verified)\par \par Specimen(s) Submitted\par Pannus\par \par Final Diagnosis\par Submitted as "pannus", site not specified, panniculectomy:\par - Skin and subcutaneous mature fibroadipose tissue consistent with\par panniculus.\par \par Verified by: Stephen Pizarro M.D\par (Electronic Signature)\par Reported on: 09/28/21 09:15 EDT, [address] [place] [address]\par Phone: [phone]   Fax: [phone]\par _________________________________________________________________\par \par Gross Description\par The specimen is received fresh, labeled "pannus" and consists of three\par \par connected fragments of skin and underlying subcutaneous tissue and\par fibroadipose tissue, weighing 3180 gm total, measuring 22.6 x 19 x 2.8\par cm, 16 x 10 x 3 cm, and 27 x 26 x 4 cm . The skin is tan. The specimen\par is serially sectioned revealing a cut surface of yellow adipose tissue.\par Representative sections are submitted. (5 blocks)\par \par Specimen was received and underwent gross examination at [facility], [address] [place] [address].\par \par 09/24/2021 09:21:17 EDT lm\par \par  \par \par  Ordered by: JANNY FARLEY       Collected/Examined: 23Sep2021 02:00PM       \par Verification Required       Stage: Final       \par  Performed at: [facility]       Resulted: 28Sep2021 09:15AM       Last Updated: 28Sep2021 09:16AM       Accession: [id number]

## 2021-11-05 NOTE — ASSESSMENT
[FreeTextEntry1] : 29 yo F with h/o morbid obesity with MWL s/p sleeve gastrectomy who is a good candidate for body contouring surgery.  Grade III hanging abdominal pannus over BL hip and thighs.\par \par Now 6 weeks s/p Courtney Encarnacion extended panniculectomy. Doing very well.\par \par - may resume walking exercises/treadmill\par - may resume lifting as tolerating\par - continue scar cream to incisions\par - continue abdominal binder at all times during exercise\par - pathology discussed - benign\par - reassurance given regarding xyphoid dogear, will re-eval for revision at 6-9 months post-op\par - post-op instructions discussed and all questions answered\par - f/u in in 6 weeks for scar check and DC binder\par \par Due to COVID-19, pre-visit patient instructions were explained to the patient and their symptoms were checked upon arrival. Masks were used by the healthcare provider and staff and the examination room was cleaned after the patient visit concluded\par \par

## 2021-11-05 NOTE — PHYSICAL EXAM
[de-identified] : well developed pleasant female, NAD [de-identified] : unlabored breathing  [de-identified] : TONOR [de-identified] : vertical and horizontal abdominal incisions healing well, c/d/i, umbilicus viable, excellent contour; minor dogear at xyphoid

## 2021-11-12 ENCOUNTER — APPOINTMENT (OUTPATIENT)
Dept: PLASTIC SURGERY | Facility: CLINIC | Age: 31
End: 2021-11-12
Payer: MEDICAID

## 2021-11-12 PROCEDURE — 99024 POSTOP FOLLOW-UP VISIT: CPT

## 2021-11-12 NOTE — PHYSICAL EXAM
[de-identified] : well developed pleasant female, NAD [de-identified] : unlabored breathing  [de-identified] : TONOR [de-identified] : vertical and horizontal abdominal incisions healing well, c/d/i, umbilicus viable, 1mm suture reaction at T-point, excellent contour; minor dogear at xyphoid

## 2021-11-12 NOTE — ASSESSMENT
[FreeTextEntry1] : 29 yo F with h/o morbid obesity with MWL s/p sleeve gastrectomy who is a good candidate for body contouring surgery.  Grade III hanging abdominal pannus over BL hip and thighs.\par \par Now 7 weeks s/p Courtney Encarnacion extended panniculectomy. Doing very well.\par \par - pt reassured, small suture reaction to resolve with daily aquaphor\par - may resume walking exercises/treadmill\par - may resume lifting as tolerating\par - continue scar cream to incisions\par - continue abdominal binder at all times during exercise\par - pathology discussed - benign\par - reassurance given regarding xyphoid dogear, will re-eval for revision at 6-9 months post-op\par - post-op instructions discussed and all questions answered\par - f/u in in 6 weeks for scar check and DC binder\par \par Due to COVID-19, pre-visit patient instructions were explained to the patient and their symptoms were checked upon arrival. Masks were used by the healthcare provider and staff and the examination room was cleaned after the patient visit concluded\par \par

## 2021-11-12 NOTE — HISTORY OF PRESENT ILLNESS
[FreeTextEntry1] : 31 yo F with PMHx of morbid obesity, vertigo (BPPV), cystic acne and benign heart murmur who presents today for panniculectomy consultation. Patient's max weight was 310lbs before undergoing sleeve gastrectomy with Dr. Sage in 2019 now at 173 lbs stable for 1 year c/o abdominal pannus and skin redundancy hanging below pubis causing lower back pain, frequent skin rash treated multiple times with antifungal and antibiotics remedies and difficulty with activities of daily living including ambulation. Patient has to hold her pannus when using the restroom and wears compressive garments daily due to discomfort. \par Denies any h/o DVT or MRSA skin infections. \par \par Here to discuss panniculectomy.\par \par Occupation - unemployed, stay at home mom \par Nonsmoker \par \par Interval hx (9/24/21). Patient presents today POD#1 s/p Courtney de-lis panniculectomy. Doing very well with adequate analgesia, not requiring Tramadol. Ambulating without difficulty. Taking oral antibiotics and performing drain care as instructed. Denies any f/c, SOB or bleeding. Drains functional with appropriate output. \par \par Interval hx (9/30/21). Patient presents today POD#7 s/p Courtney de-lis panniculectomy. Offers no significant  complaints. Denies any fever, chills or bleeding. Right drain 30/20/20, left drain 20/15/10.\par \par Interval hx (10/8/21):  here  POD#15 s/p s/p Courtney de-lis panniculectomy. Offers no significant  complaints. Denies fevers, chills, bleeding. Right drain 13/13/5.\par \par Interval hx (10/22/21):  here  POD#29 s/p s/p Courtney de-lis panniculectomy. Offers no significant  complaints. Using scar cream and very happy with results.\par \par Interval hx (11/5/21):  here  6 weeks s/p s/p Courtney de-lis panniculectomy. No complaints, except dog ear at xyphoid\par \par Interval hx (11/12/21): Pt presents today due to concern for small new wound at T-point. Denies f/c or pain.

## 2021-11-12 NOTE — DATA REVIEWED
[FreeTextEntry1] : \par  Pathology             Final\par \par No Documents Attached\par \par \par \par \par   [facility] [id number]\par Patient:   XAVIER HARKINS\par \par \par Accession:                             [id number]\par \par Collected Date/Time:                   9/23/2021 14:00 EDT\par Received Date/Time:                    9/23/2021 14:41 EDT\par \par Surgical Pathology Report - Auth (Verified)\par \par Specimen(s) Submitted\par Pannus\par \par Final Diagnosis\par Submitted as "pannus", site not specified, panniculectomy:\par - Skin and subcutaneous mature fibroadipose tissue consistent with\par panniculus.\par \par Verified by: Stephen Pizarro M.D\par (Electronic Signature)\par Reported on: 09/28/21 09:15 EDT, [address] [place] [address]\par Phone: [phone]   Fax: [phone]\par _________________________________________________________________\par \par Gross Description\par The specimen is received fresh, labeled "pannus" and consists of three\par \par connected fragments of skin and underlying subcutaneous tissue and\par fibroadipose tissue, weighing 3180 gm total, measuring 22.6 x 19 x 2.8\par cm, 16 x 10 x 3 cm, and 27 x 26 x 4 cm . The skin is tan. The specimen\par is serially sectioned revealing a cut surface of yellow adipose tissue.\par Representative sections are submitted. (5 blocks)\par \par Specimen was received and underwent gross examination at [facility], [address] [place] [address].\par \par 09/24/2021 09:21:17 EDT lm\par \par  \par \par  Ordered by: JANNY FARLEY       Collected/Examined: 23Sep2021 02:00PM       \par Verification Required       Stage: Final       \par  Performed at: [facility]       Resulted: 28Sep2021 09:15AM       Last Updated: 28Sep2021 09:16AM       Accession: [id number]

## 2021-12-13 ENCOUNTER — APPOINTMENT (OUTPATIENT)
Dept: PLASTIC SURGERY | Facility: CLINIC | Age: 31
End: 2021-12-13
Payer: MEDICAID

## 2021-12-13 DIAGNOSIS — E65 LOCALIZED ADIPOSITY: ICD-10-CM

## 2021-12-13 PROCEDURE — 99024 POSTOP FOLLOW-UP VISIT: CPT

## 2021-12-13 NOTE — DATA REVIEWED
[FreeTextEntry1] : Pathology             Final\par \par No Documents Attached\par \par \par \par   [id number]\par Patient:   XAVIER HARKINS\par \par \par Accession:                             [id number]\par \par Collected Date/Time:                   9/23/2021 14:00 EDT\par Received Date/Time:                    9/23/2021 14:41 EDT\par \par Surgical Pathology Report - Auth (Verified)\par \par Specimen(s) Submitted\par Pannus\par \par Final Diagnosis\par Submitted as "pannus", site not specified, panniculectomy:\par - Skin and subcutaneous mature fibroadipose tissue consistent with\par panniculus.\par \par Verified by: Stephen Pizarro M.D\par (Electronic Signature)\par Reported on: 09/28/21 09:15 EDT, [address] [place] [address]\par Phone: [phone]   Fax: [phone]\par _________________________________________________________________\par \par Gross Description\par The specimen is received fresh, labeled "pannus" and consists of three\par \par connected fragments of skin and underlying subcutaneous tissue and\par fibroadipose tissue, weighing 3180 gm total, measuring 22.6 x 19 x 2.8\par cm, 16 x 10 x 3 cm, and 27 x 26 x 4 cm . The skin is tan. The specimen\par is serially sectioned revealing a cut surface of yellow adipose tissue.\par Representative sections are submitted. (5 blocks)\par \par Specimen was received and underwent gross examination at [facility], [address] [place] [address].\par \par 09/24/2021 09:21:17 EDT lm\par \par  \par \par  Ordered by: JANNY FARLEY       Collected/Examined: 23Sep2021 02:00PM       \par Verification Required       Stage: Final       \par  Performed at: [facility]       Resulted: [id number] 09:15AM       Last Updated: 28Sep2021 09:16AM       Accession: [id number]

## 2021-12-13 NOTE — PHYSICAL EXAM
[de-identified] : well developed pleasant female, NAD [de-identified] : unlabored breathing  [de-identified] : TONOR [de-identified] : vertical and horizontal abdominal incisions healing well, widened umbilicus viable, firm nontender suture granuloma at T-point, excellent contour; minor dogear at xyphoid

## 2021-12-13 NOTE — HISTORY OF PRESENT ILLNESS
[FreeTextEntry1] : 29 yo F with PMHx of morbid obesity, vertigo (BPPV), cystic acne and benign heart murmur who presents today for panniculectomy consultation. Patient's max weight was 310lbs before undergoing sleeve gastrectomy with Dr. Sage in 2019 now at 173 lbs stable for 1 year c/o abdominal pannus and skin redundancy hanging below pubis causing lower back pain, frequent skin rash treated multiple times with antifungal and antibiotics remedies and difficulty with activities of daily living including ambulation. Patient has to hold her pannus when using the restroom and wears compressive garments daily due to discomfort. \par Denies any h/o DVT or MRSA skin infections. \par \par Here to discuss panniculectomy.\par \par Occupation - unemployed, stay at home mom \par Nonsmoker \par \par Interval hx (9/24/21). Patient presents today POD#1 s/p Courtney de-lis panniculectomy. Doing very well with adequate analgesia, not requiring Tramadol. Ambulating without difficulty. Taking oral antibiotics and performing drain care as instructed. Denies any f/c, SOB or bleeding. Drains functional with appropriate output. \par \par Interval hx (9/30/21). Patient presents today POD#7 s/p Courtney de-lis panniculectomy. Offers no significant  complaints. Denies any fever, chills or bleeding. Right drain 30/20/20, left drain 20/15/10.\par \par Interval hx (10/8/21):  here  POD#15 s/p s/p Courtney de-lis panniculectomy. Offers no significant  complaints. Denies fevers, chills, bleeding. Right drain 13/13/5.\par \par Interval hx (10/22/21):  here  POD#29 s/p s/p Courtney de-lis panniculectomy. Offers no significant  complaints. Using scar cream and very happy with results.\par \par Interval hx (11/5/21):  here  6 weeks s/p s/p Courtney de-lis panniculectomy. No complaints, except dog ear at xyphoid\par \par Interval hx (11/12/21): Pt presents today due to concern for small new wound at T-point. Denies f/c or pain.\par \par Interval hx (12/13/21):  Patient presents today 11 weeks s/p s/p Courtney de-tawny panniculectomy.  Denies fevers, chills.  Reports having pinpoint incision opening 3 weeks ago, no erythema, pt applied bacitracin with complete epithelialization. c/o superior vertical dogear deformity.

## 2021-12-13 NOTE — ASSESSMENT
[FreeTextEntry1] : 32 yo F with h/o morbid obesity with MWL s/p sleeve gastrectomy who is a good candidate for body contouring surgery.  Grade III hanging abdominal pannus over BL hip and thighs.\par \par Now 11 weeks s/p Courtney Encarnacion extended panniculectomy. Doing very well.\par \par - complete scar cream\par - DC abdominal binder\par - compression garment PRN daytime or exercise\par - reassurance given regarding xyphoid dogear, T-point suture granuloma, and widened umbilicus\par - will re-eval for revision at 3-6 months post-op\par - all questions answered\par - f/u in 3.5 months for possible abdominal scar revisions (dogear, granuloma, umbilicus) and pre-op photos\par \par Due to COVID-19, pre-visit patient instructions were explained to the patient and their symptoms were checked upon arrival. Masks were used by the healthcare provider and staff and the examination room was cleaned after the patient visit concluded\par \par

## 2022-03-28 ENCOUNTER — APPOINTMENT (OUTPATIENT)
Dept: PLASTIC SURGERY | Facility: CLINIC | Age: 32
End: 2022-03-28
Payer: MEDICAID

## 2022-03-28 PROCEDURE — 99213 OFFICE O/P EST LOW 20 MIN: CPT

## 2022-03-28 NOTE — HISTORY OF PRESENT ILLNESS
[FreeTextEntry1] : 29 yo F with PMHx of morbid obesity, vertigo (BPPV), cystic acne and benign heart murmur who presents today for panniculectomy consultation. Patient's max weight was 310lbs before undergoing sleeve gastrectomy with Dr. Sage in 2019 now at 173 lbs stable for 1 year c/o abdominal pannus and skin redundancy hanging below pubis causing lower back pain, frequent skin rash treated multiple times with antifungal and antibiotics remedies and difficulty with activities of daily living including ambulation. Patient has to hold her pannus when using the restroom and wears compressive garments daily due to discomfort. \par Denies any h/o DVT or MRSA skin infections. \par \par Here to discuss panniculectomy.\par \par Occupation - unemployed, stay at home mom \par Nonsmoker \par \par Interval hx (9/24/21). Patient presents today POD#1 s/p Courtney de-lis panniculectomy. Doing very well with adequate analgesia, not requiring Tramadol. Ambulating without difficulty. Taking oral antibiotics and performing drain care as instructed. Denies any f/c, SOB or bleeding. Drains functional with appropriate output. \par \par Interval hx (9/30/21). Patient presents today POD#7 s/p Courtney de-lis panniculectomy. Offers no significant  complaints. Denies any fever, chills or bleeding. Right drain 30/20/20, left drain 20/15/10.\par \par Interval hx (10/8/21):  here  POD#15 s/p s/p Courtney de-lis panniculectomy. Offers no significant  complaints. Denies fevers, chills, bleeding. Right drain 13/13/5.\par \par Interval hx (10/22/21):  here  POD#29 s/p s/p Courtney de-lis panniculectomy. Offers no significant  complaints. Using scar cream and very happy with results.\par \par Interval hx (11/5/21):  here  6 weeks s/p s/p Courtney de-lis panniculectomy. No complaints, except dog ear at xyphoid\par \par Interval hx (11/12/21): Pt presents today due to concern for small new wound at T-point. Denies f/c or pain.\par \par Interval hx (12/13/21):  Patient presents today 11 weeks s/p s/p Courtney de-lis panniculectomy.  Denies fevers, chills.  Reports having pinpoint incision opening 3 weeks ago, no erythema, pt applied bacitracin with complete epithelialization. c/o superior vertical dogear deformity.\par \par Interval hx (3/28/22):  Here 6 months s/p Courtney de-lis panniculectomy. c/o painful scar of lower T-point scar with palpable bump, exacerbated with menses.  c/o dogear deformity near bra and widened scar.  here to discuss surgical options

## 2022-03-28 NOTE — DATA REVIEWED
[FreeTextEntry1] : Pathology             Final\par \par No Documents Attached\par \par \par \par   [facility] [id number]\par Patient:   XAVIER HARKINS\par \par \par Accession:                             [id number]\par \par Collected Date/Time:                   9/23/2021 14:00 EDT\par Received Date/Time:                    9/23/2021 14:41 EDT\par \par Surgical Pathology Report - Auth (Verified)\par \par Specimen(s) Submitted\par Pannus\par \par Final Diagnosis\par Submitted as "pannus", site not specified, panniculectomy:\par - Skin and subcutaneous mature fibroadipose tissue consistent with\par panniculus.\par \par Verified by: Stephen Pizarro M.D\par (Electronic Signature)\par Reported on: 09/28/21 09:15 EDT, [address] [place] [address]\par Phone: [phone]   Fax: [phone]\par _________________________________________________________________\par \par Gross Description\par The specimen is received fresh, labeled "pannus" and consists of three\par \par connected fragments of skin and underlying subcutaneous tissue and\par fibroadipose tissue, weighing 3180 gm total, measuring 22.6 x 19 x 2.8\par cm, 16 x 10 x 3 cm, and 27 x 26 x 4 cm . The skin is tan. The specimen\par is serially sectioned revealing a cut surface of yellow adipose tissue.\par Representative sections are submitted. (5 blocks)\par \par Specimen was received and underwent gross examination at [facility], [address] [place] [address].\par \par 09/24/2021 09:21:17 EDT lm\par \par  \par \par  Ordered by: JANNY FARLEY       Collected/Examined: 23Sep2021 02:00PM       \par Verification Required       Stage: Final       \par  Performed at: [facility]       Resulted: [id number] 09:15AM       Last Updated: 28Sep2021 09:16AM       Accession: [id number]

## 2022-03-28 NOTE — PHYSICAL EXAM
[de-identified] : well developed pleasant female, NAD [de-identified] : unlabored breathing  [de-identified] : TONOR [de-identified] : vertical and horizontal abdominal incisions healing well, widened umbilicus, firm tender suture granuloma at T-point, excellent contour; tender dogear at xyphoid

## 2022-03-29 ENCOUNTER — APPOINTMENT (OUTPATIENT)
Dept: SURGERY | Facility: CLINIC | Age: 32
End: 2022-03-29
Payer: MEDICAID

## 2022-03-29 VITALS
WEIGHT: 188 LBS | DIASTOLIC BLOOD PRESSURE: 60 MMHG | OXYGEN SATURATION: 99 % | SYSTOLIC BLOOD PRESSURE: 110 MMHG | BODY MASS INDEX: 32.1 KG/M2 | HEART RATE: 74 BPM | TEMPERATURE: 96.9 F | HEIGHT: 64 IN

## 2022-03-29 DIAGNOSIS — Z98.84 BARIATRIC SURGERY STATUS: ICD-10-CM

## 2022-03-29 DIAGNOSIS — E66.9 OBESITY, UNSPECIFIED: ICD-10-CM

## 2022-03-29 PROCEDURE — 99212 OFFICE O/P EST SF 10 MIN: CPT

## 2022-03-29 RX ORDER — VITAMIN B COMPLEX
TABLET ORAL DAILY
Refills: 0 | Status: ACTIVE | COMMUNITY

## 2022-03-29 RX ORDER — CLINDAMYCIN PHOSPHATE 20 MG/G
2 CREAM VAGINAL
Qty: 40 | Refills: 0 | Status: COMPLETED | COMMUNITY
Start: 2022-02-21

## 2022-03-29 RX ORDER — CALCIUM CARB/VITAMIN D3/VIT K1 650MG-12.5
TABLET,CHEWABLE ORAL DAILY
Refills: 0 | Status: DISCONTINUED | COMMUNITY
End: 2022-03-29

## 2022-03-29 RX ORDER — ASPIRIN 81 MG
600-400 TABLET, DELAYED RELEASE (ENTERIC COATED) ORAL
Refills: 0 | Status: ACTIVE | COMMUNITY

## 2022-03-29 RX ORDER — MAGNESIUM OXIDE/MAG AA CHELATE 300 MG
CAPSULE ORAL DAILY
Refills: 0 | Status: ACTIVE | COMMUNITY

## 2022-03-29 RX ORDER — CELECOXIB 400 MG/1
400 CAPSULE ORAL
Qty: 6 | Refills: 0 | Status: DISCONTINUED | COMMUNITY
Start: 2021-09-14 | End: 2022-03-29

## 2022-03-29 RX ORDER — GABAPENTIN 300 MG/1
300 CAPSULE ORAL
Qty: 6 | Refills: 0 | Status: DISCONTINUED | COMMUNITY
Start: 2021-09-13 | End: 2022-03-29

## 2022-03-29 RX ORDER — COLD-HOT PACK
50 EACH MISCELLANEOUS DAILY
Refills: 0 | Status: ACTIVE | COMMUNITY

## 2022-03-29 RX ORDER — DOXYCYCLINE HYCLATE 100 MG/1
100 TABLET ORAL
Qty: 20 | Refills: 0 | Status: COMPLETED | COMMUNITY
Start: 2022-03-10

## 2022-03-29 NOTE — PLAN
[FreeTextEntry1] : Plan: Continue with behavioral changes.\par          Blood work.\par          RTO in 6 months.\par          Call with concerns.

## 2022-03-29 NOTE — ASSESSMENT
[FreeTextEntry1] : XAVIER HARKINS is a 31 year female seen today for Bariatric follow up visit. Patient is doing well with her weight loss goals and is focusing on weight bearing exercises.\par Patient is compliant with dietary guidelines and continues to plan her meals.\par She eats 3 meals/day. Breakfast -protein shake with coffee or a Greek yogurt. Lunch - cottage cheese with spinach or deli turkey/ham on homemade almond wrap. Dinner - tuna fish, chicken or salmon with cauliflower rice and vegetables/mixed green salad. . Snack - string cheese, berries and roasted Edamame.\par Fluid intake is adequate with mostly water. She often drinks ~ with 150 ounces daily.\par She is exercising 5-7 days doing weight bearing exercises, running on the treadmill and kick boxing. She also use a  1-2 days/week.

## 2022-03-29 NOTE — HISTORY OF PRESENT ILLNESS
[de-identified] : Patient had surgery approximately 2 1/2 years ago. She was last seen in 2020 and has since had Courtney Encarnacion extended panniculectomy by Dr. Barfield.\par Denies reflux/heartburn/nausea/vomiting. Bowel movements are regular.\par All medications were reconciled.\par

## 2022-03-29 NOTE — PHYSICAL EXAM
[Normal] : normoactive bowel sounds, soft and nontender, no hepatosplenomegaly or masses appreciated. Incisions healing appropriately without erythema or drainage [de-identified] : Well healed incisions

## 2022-04-22 ENCOUNTER — NON-APPOINTMENT (OUTPATIENT)
Age: 32
End: 2022-04-22

## 2022-05-12 ENCOUNTER — OUTPATIENT (OUTPATIENT)
Dept: OUTPATIENT SERVICES | Facility: HOSPITAL | Age: 32
LOS: 1 days | Discharge: HOME | End: 2022-05-12
Payer: MEDICAID

## 2022-05-12 VITALS
TEMPERATURE: 98 F | HEIGHT: 64 IN | OXYGEN SATURATION: 99 % | DIASTOLIC BLOOD PRESSURE: 55 MMHG | HEART RATE: 54 BPM | WEIGHT: 183.65 LBS | SYSTOLIC BLOOD PRESSURE: 102 MMHG | RESPIRATION RATE: 18 BRPM

## 2022-05-12 DIAGNOSIS — Z98.890 OTHER SPECIFIED POSTPROCEDURAL STATES: Chronic | ICD-10-CM

## 2022-05-12 DIAGNOSIS — K08.409 PARTIAL LOSS OF TEETH, UNSPECIFIED CAUSE, UNSPECIFIED CLASS: Chronic | ICD-10-CM

## 2022-05-12 DIAGNOSIS — Z01.818 ENCOUNTER FOR OTHER PREPROCEDURAL EXAMINATION: ICD-10-CM

## 2022-05-12 DIAGNOSIS — Z98.84 BARIATRIC SURGERY STATUS: Chronic | ICD-10-CM

## 2022-05-12 DIAGNOSIS — Z90.3 ACQUIRED ABSENCE OF STOMACH [PART OF]: Chronic | ICD-10-CM

## 2022-05-12 DIAGNOSIS — L90.5 SCAR CONDITIONS AND FIBROSIS OF SKIN: ICD-10-CM

## 2022-05-12 LAB
ALBUMIN SERPL ELPH-MCNC: 4.9 G/DL — SIGNIFICANT CHANGE UP (ref 3.5–5.2)
ALP SERPL-CCNC: 50 U/L — SIGNIFICANT CHANGE UP (ref 30–115)
ALT FLD-CCNC: 24 U/L — SIGNIFICANT CHANGE UP (ref 0–41)
ANION GAP SERPL CALC-SCNC: 12 MMOL/L — SIGNIFICANT CHANGE UP (ref 7–14)
APTT BLD: 34.7 SEC — SIGNIFICANT CHANGE UP (ref 27–39.2)
AST SERPL-CCNC: 29 U/L — SIGNIFICANT CHANGE UP (ref 0–41)
BASOPHILS # BLD AUTO: 0.04 K/UL — SIGNIFICANT CHANGE UP (ref 0–0.2)
BASOPHILS NFR BLD AUTO: 0.8 % — SIGNIFICANT CHANGE UP (ref 0–1)
BILIRUB SERPL-MCNC: 0.6 MG/DL — SIGNIFICANT CHANGE UP (ref 0.2–1.2)
BUN SERPL-MCNC: 20 MG/DL — SIGNIFICANT CHANGE UP (ref 10–20)
CALCIUM SERPL-MCNC: 9.7 MG/DL — SIGNIFICANT CHANGE UP (ref 8.5–10.1)
CHLORIDE SERPL-SCNC: 104 MMOL/L — SIGNIFICANT CHANGE UP (ref 98–110)
CO2 SERPL-SCNC: 24 MMOL/L — SIGNIFICANT CHANGE UP (ref 17–32)
CREAT SERPL-MCNC: 0.8 MG/DL — SIGNIFICANT CHANGE UP (ref 0.7–1.5)
EGFR: 102 ML/MIN/1.73M2 — SIGNIFICANT CHANGE UP
EOSINOPHIL # BLD AUTO: 0.18 K/UL — SIGNIFICANT CHANGE UP (ref 0–0.7)
EOSINOPHIL NFR BLD AUTO: 3.6 % — SIGNIFICANT CHANGE UP (ref 0–8)
GLUCOSE SERPL-MCNC: 68 MG/DL — LOW (ref 70–99)
HCT VFR BLD CALC: 46 % — SIGNIFICANT CHANGE UP (ref 37–47)
HGB BLD-MCNC: 15.1 G/DL — SIGNIFICANT CHANGE UP (ref 12–16)
IMM GRANULOCYTES NFR BLD AUTO: 0.2 % — SIGNIFICANT CHANGE UP (ref 0.1–0.3)
INR BLD: 1.03 RATIO — SIGNIFICANT CHANGE UP (ref 0.65–1.3)
LYMPHOCYTES # BLD AUTO: 1.78 K/UL — SIGNIFICANT CHANGE UP (ref 1.2–3.4)
LYMPHOCYTES # BLD AUTO: 35.5 % — SIGNIFICANT CHANGE UP (ref 20.5–51.1)
MCHC RBC-ENTMCNC: 29.8 PG — SIGNIFICANT CHANGE UP (ref 27–31)
MCHC RBC-ENTMCNC: 32.8 G/DL — SIGNIFICANT CHANGE UP (ref 32–37)
MCV RBC AUTO: 90.7 FL — SIGNIFICANT CHANGE UP (ref 81–99)
MONOCYTES # BLD AUTO: 0.6 K/UL — SIGNIFICANT CHANGE UP (ref 0.1–0.6)
MONOCYTES NFR BLD AUTO: 12 % — HIGH (ref 1.7–9.3)
NEUTROPHILS # BLD AUTO: 2.41 K/UL — SIGNIFICANT CHANGE UP (ref 1.4–6.5)
NEUTROPHILS NFR BLD AUTO: 47.9 % — SIGNIFICANT CHANGE UP (ref 42.2–75.2)
NRBC # BLD: 0 /100 WBCS — SIGNIFICANT CHANGE UP (ref 0–0)
PLATELET # BLD AUTO: 250 K/UL — SIGNIFICANT CHANGE UP (ref 130–400)
POTASSIUM SERPL-MCNC: 5.1 MMOL/L — HIGH (ref 3.5–5)
POTASSIUM SERPL-SCNC: 5.1 MMOL/L — HIGH (ref 3.5–5)
PROT SERPL-MCNC: 7.7 G/DL — SIGNIFICANT CHANGE UP (ref 6–8)
PROTHROM AB SERPL-ACNC: 11.8 SEC — SIGNIFICANT CHANGE UP (ref 9.95–12.87)
RBC # BLD: 5.07 M/UL — SIGNIFICANT CHANGE UP (ref 4.2–5.4)
RBC # FLD: 12 % — SIGNIFICANT CHANGE UP (ref 11.5–14.5)
SODIUM SERPL-SCNC: 140 MMOL/L — SIGNIFICANT CHANGE UP (ref 135–146)
WBC # BLD: 5.02 K/UL — SIGNIFICANT CHANGE UP (ref 4.8–10.8)
WBC # FLD AUTO: 5.02 K/UL — SIGNIFICANT CHANGE UP (ref 4.8–10.8)

## 2022-05-12 PROCEDURE — 93010 ELECTROCARDIOGRAM REPORT: CPT

## 2022-05-12 RX ORDER — PREGABALIN 225 MG/1
1 CAPSULE ORAL
Qty: 0 | Refills: 0 | DISCHARGE

## 2022-05-12 RX ORDER — MULTIVIT-MIN/FERROUS GLUCONATE 9 MG/15 ML
0 LIQUID (ML) ORAL
Qty: 0 | Refills: 0 | DISCHARGE

## 2022-05-12 RX ORDER — MILK THISTLE 150 MG
1 CAPSULE ORAL
Qty: 0 | Refills: 0 | DISCHARGE

## 2022-05-12 RX ORDER — ISOTRETINOIN 20 MG/1
1 CAPSULE, LIQUID FILLED ORAL
Qty: 0 | Refills: 0 | DISCHARGE

## 2022-05-12 RX ORDER — ZINC SULFATE TAB 220 MG (50 MG ZINC EQUIVALENT) 220 (50 ZN) MG
1 TAB ORAL
Qty: 0 | Refills: 0 | DISCHARGE

## 2022-05-12 RX ORDER — CHOLECALCIFEROL (VITAMIN D3) 125 MCG
1 CAPSULE ORAL
Qty: 0 | Refills: 0 | DISCHARGE

## 2022-05-12 RX ORDER — L.ACIDOPH/B.ANIMALIS/B.LONGUM 15B CELL
1 CAPSULE ORAL
Qty: 0 | Refills: 0 | DISCHARGE

## 2022-05-12 RX ORDER — MAGNESIUM CARBONATE 54 MG/5 ML
0 LIQUID (ML) ORAL
Qty: 0 | Refills: 0 | DISCHARGE

## 2022-05-12 RX ORDER — FERROUS SULFATE 325(65) MG
1 TABLET ORAL
Qty: 0 | Refills: 0 | DISCHARGE

## 2022-05-12 NOTE — H&P PST ADULT - NSICDXFAMILYHX_GEN_ALL_CORE_FT
FAMILY HISTORY:  Family history of diabetes mellitus (DM)  FH: breast cancer  FH: heart attack  FH: HTN (hypertension)

## 2022-05-12 NOTE — H&P PST ADULT - NSICDXPASTSURGICALHX_GEN_ALL_CORE_FT
PAST SURGICAL HISTORY:  H/O gastric sleeve 2019- WT LOSS 140LB    S/P endoscopy     S/P panniculectomy     [place] teeth extracted

## 2022-05-12 NOTE — H&P PST ADULT - HISTORY OF PRESENT ILLNESS
CURRENTLY  DENIES ANY CP, SOB, PALPITATIONS, COUGH OR DYSURIA  EXERCISE TOLERANCE 5 FOS WITHOUT SOB- SHE IS      AS PER PATIENT  this is his/her complete medical history including medications - PRESCRIPTIONS  OVER THE COUNTER MEDS    pt denies any covid s/s, or tested positive in the past.  Received covid vaccine  pt advised self quarantine till day of procedure    Anesthesia Alert  NO--Difficult Airway  NO--History of neck surgery or radiation  NO--Limited ROM of neck  NO--History of Malignant hyperthermia  NO--No personal or family history of Pseudocholinesterase deficiency.  NO--Prior Anesthesia Complication  NO--Latex Allergy  NO--Loose teeth  NO--History of Rheumatoid Arthritis  NO--STEPHEN  NO--Bleeding risk  NO--Other_____    Patient verbalized understanding of instructions and was given the opportunity to ask questions and have them answered.

## 2022-05-12 NOTE — H&P PST ADULT - REASON FOR ADMISSION
30 Y/O F SCHEDULED FOR PAST FOR PAINFUL SCAR REVISION OF UPPER AND LOWER ABDOMINAL SCAR AND UMBILICUS ON 6/2/22 WITH DR FARLEY UNDER LSB. PT IS S/P FLUER DI LIS PROCEDURE 9/2021. SHE DEVELOPED SOME SCAR TISSUE. SHE HAS SOME DISCOMFORT AT THE SITE.

## 2022-05-24 DIAGNOSIS — M79.2 NEURALGIA AND NEURITIS, UNSPECIFIED: ICD-10-CM

## 2022-05-24 RX ORDER — GABAPENTIN 300 MG/1
300 CAPSULE ORAL
Qty: 7 | Refills: 0 | Status: ACTIVE | COMMUNITY
Start: 2022-05-24 | End: 1900-01-01

## 2022-05-30 ENCOUNTER — LABORATORY RESULT (OUTPATIENT)
Age: 32
End: 2022-05-30

## 2022-06-01 NOTE — ASU PATIENT PROFILE, ADULT - NSICDXPASTSURGICALHX_GEN_ALL_CORE_FT
PAST SURGICAL HISTORY:  H/O gastric sleeve 2019- WT LOSS 140LB    S/P endoscopy     S/P panniculectomy     [place] teeth extracted

## 2022-06-01 NOTE — ASU PATIENT PROFILE, ADULT - ANESTHESIA, PREVIOUS REACTION, PROFILE
PHYSICAL EXAM:  Constitutional: Patient seated comfortably in bed, of appropriate color, nutrition, and hydration.   HEENT: PERRLA, Normal Range of eye movement with no complaint of diplopia, Normal Hearing  Neck: No LAD, No JVD  Back: Normal spine flexure, No CVA tenderness  Respiratory: Normal air entry, Lungs clear to auscultation b/l w/o wheeze or crepitations.   Cardiovascular: S1 and S2 present - no additional abnormal sounds or murmurs. Normal rhythm and rate of pulse.   Gastrointestinal: BS+, soft, NT/ND  Extremities: No peripheral edema  Vascular: 2+ peripheral pulses  Neurological: Neurologic:  A&O x 3  	-- CN: unimpaired visual acuity.  Blink to threat intact B/L.  No gaze palsy. vertical nystagmus with upwards gaze;  Pupils reactive b/l. Face is symmetric without facial droop.  Hearing intact b/L. no tongue deviation; soft palette elevation symmetric; shoulder shrug symmetric b/l  	-- Motor: Normal bulk and tone throughout. Left side weaker as compared to R - drift not present on R side, minimal effort achieved on L  	-- Sensory: sensation in tact on R - diminished on L   	-- Coordination: no finger to nose dysmetria; no limb ataxia noted during cerebellar exam.  	-- Reflexes: 2+ in brachioradialis and patellae b/l.    -- Gait: Deferred   Psychiatric: Depressed mood and flat affect none

## 2022-06-02 ENCOUNTER — TRANSCRIPTION ENCOUNTER (OUTPATIENT)
Age: 32
End: 2022-06-02

## 2022-06-02 ENCOUNTER — OUTPATIENT (OUTPATIENT)
Dept: OUTPATIENT SERVICES | Facility: HOSPITAL | Age: 32
LOS: 1 days | Discharge: HOME | End: 2022-06-02

## 2022-06-02 ENCOUNTER — APPOINTMENT (OUTPATIENT)
Dept: PLASTIC SURGERY | Facility: AMBULATORY SURGERY CENTER | Age: 32
End: 2022-06-02
Payer: MEDICAID

## 2022-06-02 VITALS
RESPIRATION RATE: 18 BRPM | WEIGHT: 183.65 LBS | HEIGHT: 64 IN | OXYGEN SATURATION: 99 % | HEART RATE: 54 BPM | DIASTOLIC BLOOD PRESSURE: 64 MMHG | SYSTOLIC BLOOD PRESSURE: 109 MMHG | TEMPERATURE: 99 F

## 2022-06-02 VITALS — RESPIRATION RATE: 21 BRPM | DIASTOLIC BLOOD PRESSURE: 71 MMHG | HEART RATE: 84 BPM | SYSTOLIC BLOOD PRESSURE: 118 MMHG

## 2022-06-02 DIAGNOSIS — Z98.890 OTHER SPECIFIED POSTPROCEDURAL STATES: Chronic | ICD-10-CM

## 2022-06-02 DIAGNOSIS — K08.409 PARTIAL LOSS OF TEETH, UNSPECIFIED CAUSE, UNSPECIFIED CLASS: Chronic | ICD-10-CM

## 2022-06-02 DIAGNOSIS — Z90.3 ACQUIRED ABSENCE OF STOMACH [PART OF]: Chronic | ICD-10-CM

## 2022-06-02 PROCEDURE — 14301 TIS TRNFR ANY 30.1-60 SQ CM: CPT

## 2022-06-02 PROCEDURE — 14302 TIS TRNFR ADDL 30 SQ CM: CPT

## 2022-06-02 RX ORDER — SODIUM CHLORIDE 9 MG/ML
1000 INJECTION, SOLUTION INTRAVENOUS
Refills: 0 | Status: DISCONTINUED | OUTPATIENT
Start: 2022-06-02 | End: 2022-06-16

## 2022-06-02 RX ORDER — TRAMADOL HYDROCHLORIDE 50 MG/1
1 TABLET ORAL
Qty: 12 | Refills: 0
Start: 2022-06-02 | End: 2022-06-04

## 2022-06-02 RX ORDER — HEPARIN SODIUM 5000 [USP'U]/ML
5000 INJECTION INTRAVENOUS; SUBCUTANEOUS ONCE
Refills: 0 | Status: COMPLETED | OUTPATIENT
Start: 2022-06-02 | End: 2022-06-02

## 2022-06-02 RX ORDER — CEPHALEXIN 500 MG
1 CAPSULE ORAL
Qty: 14 | Refills: 0
Start: 2022-06-02 | End: 2022-06-08

## 2022-06-02 RX ORDER — ACETAMINOPHEN 500 MG
1000 TABLET ORAL ONCE
Refills: 0 | Status: COMPLETED | OUTPATIENT
Start: 2022-06-02 | End: 2022-06-02

## 2022-06-02 RX ORDER — HYDROMORPHONE HYDROCHLORIDE 2 MG/ML
1 INJECTION INTRAMUSCULAR; INTRAVENOUS; SUBCUTANEOUS
Refills: 0 | Status: DISCONTINUED | OUTPATIENT
Start: 2022-06-02 | End: 2022-06-02

## 2022-06-02 RX ORDER — CEPHALEXIN 500 MG
1 CAPSULE ORAL
Qty: 20 | Refills: 0
Start: 2022-06-02 | End: 2022-06-11

## 2022-06-02 RX ORDER — MEPERIDINE HYDROCHLORIDE 50 MG/ML
12.5 INJECTION INTRAMUSCULAR; INTRAVENOUS; SUBCUTANEOUS ONCE
Refills: 0 | Status: DISCONTINUED | OUTPATIENT
Start: 2022-06-02 | End: 2022-06-02

## 2022-06-02 RX ORDER — ONDANSETRON 8 MG/1
4 TABLET, FILM COATED ORAL ONCE
Refills: 0 | Status: DISCONTINUED | OUTPATIENT
Start: 2022-06-02 | End: 2022-06-16

## 2022-06-02 RX ORDER — METOCLOPRAMIDE HCL 10 MG
10 TABLET ORAL ONCE
Refills: 0 | Status: DISCONTINUED | OUTPATIENT
Start: 2022-06-02 | End: 2022-06-16

## 2022-06-02 RX ORDER — HYDROMORPHONE HYDROCHLORIDE 2 MG/ML
0.5 INJECTION INTRAMUSCULAR; INTRAVENOUS; SUBCUTANEOUS
Refills: 0 | Status: DISCONTINUED | OUTPATIENT
Start: 2022-06-02 | End: 2022-06-02

## 2022-06-02 RX ADMIN — Medication 1000 MILLIGRAM(S): at 17:48

## 2022-06-02 RX ADMIN — HEPARIN SODIUM 5000 UNIT(S): 5000 INJECTION INTRAVENOUS; SUBCUTANEOUS at 14:18

## 2022-06-02 RX ADMIN — SODIUM CHLORIDE 100 MILLILITER(S): 9 INJECTION, SOLUTION INTRAVENOUS at 16:45

## 2022-06-02 RX ADMIN — Medication 400 MILLIGRAM(S): at 17:20

## 2022-06-02 NOTE — ASU DISCHARGE PLAN (ADULT/PEDIATRIC) - FOLLOW UP APPOINTMENTS
[facility],  Endoscopy/Ambulatory Surgery [facility]:  [facility]

## 2022-06-02 NOTE — BRIEF OPERATIVE NOTE - NSICDXBRIEFPROCEDURE_GEN_ALL_CORE_FT
PROCEDURES:  Complex repair of trunk, 5.1cm to 7.5cm 02-Jun-2022 16:27:29  Nilton Nunez  Complex repair of trunk, each additional 5 cm or less 02-Jun-2022 16:27:47  Nilton Nunez

## 2022-06-02 NOTE — ASU DISCHARGE PLAN (ADULT/PEDIATRIC) - ASU DC SPECIAL INSTRUCTIONSFT
Diet: You may resume your usual diet. Avoid alcohol and excessive salt intake for two weeks following surgery. This will help to minimize swelling.    Medications: Take Tylenol 650mg every 6 hours. If pain is still not well controlled, take Tramadol as needed. Call the office with any issues. Take all antibiotics as prescribed. Remember no NSAIDS (Aspirin, Advil, Aleve, Motrin) as they can increase bruising.    Activity: Start walking as soon as possible to increase circulation and prevent blood clots. You may take care of your personal needs as desired, however, no lifting or strenuous activity. When laying down, use pillows under your head and knees to create a "beach chair" position".    Wound care: Keep your dressing clean, dry, and intact until seen by MD/PA. Wear the abdominal binder which will be provided to you at all times. Do not smoke! It delays wound healing and increases the risk of complications.    Personal Hygiene: Do not get the operative area wet. You are allowed to sponge bathe. Do not remove the abdominal binder. No tub soaking.    Things to expect: The operative area may be bruised, swollen, and painful. You may also have temporary numbness which will improve over time.    In case of emergency: call the office any time day or night. Post-operative care will be provided in the office one week following surgery. If you do not already have an appointment, please call during regular office hours to schedule: [phone].     We wish you a pleasant recovery.

## 2022-06-02 NOTE — ASU DISCHARGE PLAN (ADULT/PEDIATRIC) - NS MD DC FALL RISK RISK
For information on Fall & Injury Prevention, visit: [email or web address] OR  [email or web address] OR  https://www.cdc.gov/steadi/patient.html

## 2022-06-02 NOTE — CHART NOTE - NSCHARTNOTEFT_GEN_A_CORE
PACU ANESTHESIA ADMISSION NOTE      Procedure: Complex repair of trunk, 5.1cm to 7.5cm    Complex repair of trunk, each additional 5 cm or less      Post op diagnosis:  S/P panniculectomy        ____  Intubated  TV:______       Rate: ______      FiO2: ______    __x__  Patent Airway    __x__  Full return of protective reflexes    __x__  Full recovery from anesthesia / back to baseline     Vitals:   T:    98.0       R:   16               BP:  124/59                Sat: 100%                  P: 86      Mental Status:  __x__ Awake   __x___ Alert   _____ Drowsy   _____ Sedated    Nausea/Vomiting:  __x__ NO  ______Yes,   See Post - Op Orders          Pain Scale (0-10):  __0___    Treatment: ____ None    ___x_ See Post - Op/PCA Orders    Post - Operative Fluids:   ____ Oral   __x__ See Post - Op Orders    Plan: Discharge:   __x__Home       _____Floor     _____Critical Care    _____  Other:_________________    Comments:  pt tolerated procedure well, pt transferred to PACU and report was given to PACU RN, vital signs are stable and pt shows no signs of distress. no anesthesia complications, discharge pt when criteria met.

## 2022-06-02 NOTE — ASU DISCHARGE PLAN (ADULT/PEDIATRIC) - CARE PROVIDER_API CALL
Ela Barfield)  Surgery  Plastics  [address] [place] [address], [address]  [place] [address]  Phone: [phone]  Fax: [phone]  Follow Up Time:

## 2022-06-09 ENCOUNTER — APPOINTMENT (OUTPATIENT)
Dept: PLASTIC SURGERY | Facility: CLINIC | Age: 32
End: 2022-06-09
Payer: MEDICAID

## 2022-06-09 DIAGNOSIS — L90.5 SCAR CONDITIONS AND FIBROSIS OF SKIN: ICD-10-CM

## 2022-06-09 DIAGNOSIS — E66.9 OBESITY, UNSPECIFIED: ICD-10-CM

## 2022-06-09 PROCEDURE — 99024 POSTOP FOLLOW-UP VISIT: CPT

## 2022-06-09 NOTE — HISTORY OF PRESENT ILLNESS
[FreeTextEntry1] : 31 yo F with PMHx of morbid obesity, vertigo (BPPV), cystic acne and benign heart murmur who presents today for panniculectomy consultation. Patient's max weight was 310lbs before undergoing sleeve gastrectomy with Dr. Sage in 2019 now at 173 lbs stable for 1 year c/o abdominal pannus and skin redundancy hanging below pubis causing lower back pain, frequent skin rash treated multiple times with antifungal and antibiotics remedies and difficulty with activities of daily living including ambulation. Patient has to hold her pannus when using the restroom and wears compressive garments daily due to discomfort. \par Denies any h/o DVT or MRSA skin infections. \par \par Here to discuss panniculectomy.\par \par Occupation - unemployed, stay at home mom \par Nonsmoker \par \par Interval hx (9/24/21). Patient presents today POD#1 s/p Courtney de-lis panniculectomy. Doing very well with adequate analgesia, not requiring Tramadol. Ambulating without difficulty. Taking oral antibiotics and performing drain care as instructed. Denies any f/c, SOB or bleeding. Drains functional with appropriate output. \par \par Interval hx (9/30/21). Patient presents today POD#7 s/p Courtney de-lis panniculectomy. Offers no significant  complaints. Denies any fever, chills or bleeding. Right drain 30/20/20, left drain 20/15/10.\par \par Interval hx (10/8/21):  here  POD#15 s/p s/p Courtney de-lis panniculectomy. Offers no significant  complaints. Denies fevers, chills, bleeding. Right drain 13/13/5.\par \par Interval hx (10/22/21):  here  POD#29 s/p s/p Courtney de-lis panniculectomy. Offers no significant  complaints. Using scar cream and very happy with results.\par \par Interval hx (11/5/21):  here  6 weeks s/p s/p Courtney de-lis panniculectomy. No complaints, except dog ear at xyphoid\par \par Interval hx (11/12/21): Pt presents today due to concern for small new wound at T-point. Denies f/c or pain.\par \par Interval hx (12/13/21):  Patient presents today 11 weeks s/p s/p Courtney de-lis panniculectomy.  Denies fevers, chills.  Reports having pinpoint incision opening 3 weeks ago, no erythema, pt applied bacitracin with complete epithelialization. c/o superior vertical dogear deformity.\par \par Interval hx (3/28/22):  Here 6 months s/p Courtney de-lis panniculectomy. c/o painful scar of lower T-point scar with palpable bump, exacerbated with menses.  c/o dogear deformity near bra and widened scar.  here to discuss surgical options\par \par Interval hx (6/9/22). Patient presents today POD#7 s/p excision of painful abdominal scars. Doing very well with no significant complaints. Taking all prescribed medications. Denies any f/c or drainage.

## 2022-06-09 NOTE — PHYSICAL EXAM
[de-identified] : well developed pleasant female, NAD [de-identified] : unlabored breathing  [de-identified] : TONOR [de-identified] : abdominal incisions healing well, c/d/i, umbilicus viable, improved contour at xiphoid, no erythema, minimal swelling as expected

## 2022-06-09 NOTE — ASSESSMENT
[FreeTextEntry1] : 30 yo F with h/o morbid obesity with MWL s/p sleeve gastrectomy who is a good candidate for body contouring surgery.  Grade III hanging abdominal pannus over BL hip and thighs.\par \par 6 months s/p Courtney Encarnacion extended panniculectomy. Doing very well.\par \par Now POD#7 s/p painful scar revision of upper and lower abdominal scar and umbilicus. Doing well. \par \par - dressings changed\par - may shower\par - daily Aquaphor\par - abdominal binder \par - post-op instructions discussed and all questions were answered\par - f/u next week for suture removal and scar management \par \par Due to COVID-19, pre-visit patient instructions were explained to the patient and their symptoms were checked upon arrival. Masks were used by the healthcare provider and staff and the examination room was cleaned after the patient visit concluded\par \par

## 2022-06-17 ENCOUNTER — APPOINTMENT (OUTPATIENT)
Dept: PLASTIC SURGERY | Facility: CLINIC | Age: 32
End: 2022-06-17
Payer: MEDICAID

## 2022-06-17 DIAGNOSIS — L90.5 PAIN, UNSPECIFIED: ICD-10-CM

## 2022-06-17 DIAGNOSIS — R52 PAIN, UNSPECIFIED: ICD-10-CM

## 2022-06-17 PROCEDURE — 99024 POSTOP FOLLOW-UP VISIT: CPT

## 2022-06-21 PROBLEM — R52 PAINFUL SCAR: Status: ACTIVE | Noted: 2022-06-09

## 2022-06-21 NOTE — PHYSICAL EXAM
[de-identified] : well developed pleasant female, NAD [de-identified] : unlabored breathing  [de-identified] : TONOR [de-identified] : abdominal incisions healing well, c/d/i, umbilicus viable, improved contour at xiphoid, no erythema, slight puckering on carlos de lis incision, sutures in place

## 2022-06-21 NOTE — ASSESSMENT
[FreeTextEntry1] : 32 yo F with h/o morbid obesity with MWL s/p sleeve gastrectomy who is a good candidate for body contouring surgery.  Grade III hanging abdominal pannus over BL hip and thighs.\par \par 6 months s/p Courtney Encarnacion extended panniculectomy. Doing very well.\par \par Now POD#15 s/p painful scar revision of upper and lower abdominal scar and umbilicus. Doing well. \par \par - Sutures removed\par - heat/massage for neuropathic pain, assurance provided\par - baci BID x3d only where umbilical suture removed\par - steri strips placed on remainder of incision, once they fall off (within the week) ok to start mederma or silicone tx\par - may shower\par - continue abdominal binder \par - no heaving lifting/pushing/pulling anything >10lbs x6 weeks total. Light activity ok\par - post-op instructions discussed and all questions were answered\par - f/u 1 month with Dr. Barfield for clearance to return to full activity\par \par Due to COVID-19, pre-visit patient instructions were explained to the patient and their symptoms were checked upon arrival. Masks were used by the healthcare provider and staff and the examination room was cleaned after the patient visit concluded\par \par

## 2022-06-21 NOTE — HISTORY OF PRESENT ILLNESS
[FreeTextEntry1] : 29 yo F with PMHx of morbid obesity, vertigo (BPPV), cystic acne and benign heart murmur who presents today for panniculectomy consultation. Patient's max weight was 310lbs before undergoing sleeve gastrectomy with Dr. Sage in 2019 now at 173 lbs stable for 1 year c/o abdominal pannus and skin redundancy hanging below pubis causing lower back pain, frequent skin rash treated multiple times with antifungal and antibiotics remedies and difficulty with activities of daily living including ambulation. Patient has to hold her pannus when using the restroom and wears compressive garments daily due to discomfort. \par Denies any h/o DVT or MRSA skin infections. \par \par Here to discuss panniculectomy.\par \par Occupation - unemployed, stay at home mom \par Nonsmoker \par \par Interval hx (9/24/21). Patient presents today POD#1 s/p Courtney de-lis panniculectomy. Doing very well with adequate analgesia, not requiring Tramadol. Ambulating without difficulty. Taking oral antibiotics and performing drain care as instructed. Denies any f/c, SOB or bleeding. Drains functional with appropriate output. \par \par Interval hx (9/30/21). Patient presents today POD#7 s/p Courtney de-lis panniculectomy. Offers no significant  complaints. Denies any fever, chills or bleeding. Right drain 30/20/20, left drain 20/15/10.\par \par Interval hx (10/8/21):  here  POD#15 s/p s/p Courtney de-lis panniculectomy. Offers no significant  complaints. Denies fevers, chills, bleeding. Right drain 13/13/5.\par \par Interval hx (10/22/21):  here  POD#29 s/p s/p Courtney de-lis panniculectomy. Offers no significant  complaints. Using scar cream and very happy with results.\par \par Interval hx (11/5/21):  here  6 weeks s/p s/p Courtney de-lis panniculectomy. No complaints, except dog ear at xyphoid\par \par Interval hx (11/12/21): Pt presents today due to concern for small new wound at T-point. Denies f/c or pain.\par \par Interval hx (12/13/21):  Patient presents today 11 weeks s/p s/p Courtney de-lis panniculectomy.  Denies fevers, chills.  Reports having pinpoint incision opening 3 weeks ago, no erythema, pt applied bacitracin with complete epithelialization. c/o superior vertical dogear deformity.\par \par Interval hx (3/28/22):  Here 6 months s/p Courtney de-lis panniculectomy. c/o painful scar of lower T-point scar with palpable bump, exacerbated with menses.  c/o dogear deformity near bra and widened scar.  here to discuss surgical options\par \par Interval hx (6/9/22). Patient presents today POD#7 s/p excision of painful abdominal scars. Doing very well with no significant complaints. Taking all prescribed medications. Denies any f/c or drainage. \par \par Interval hx (6/17/22) Pt seen by Jo Jovel PA-C: Pt is 15 days s/p excision of abd scars. Denies fever/chills/drainage/bleeding. Mild intermittent sharp shooting pains right lower abd. No longer taking pain medication or abx. Pt has been up and walking.

## 2022-07-28 ENCOUNTER — APPOINTMENT (OUTPATIENT)
Dept: PLASTIC SURGERY | Facility: CLINIC | Age: 32
End: 2022-07-28

## 2022-07-28 PROCEDURE — 99024 POSTOP FOLLOW-UP VISIT: CPT

## 2022-07-28 NOTE — HISTORY OF PRESENT ILLNESS
[FreeTextEntry1] : 31 yo F with PMHx of morbid obesity, vertigo (BPPV), cystic acne and benign heart murmur who presents today for panniculectomy consultation. Patient's max weight was 310lbs before undergoing sleeve gastrectomy with Dr. Sage in 2019 now at 173 lbs stable for 1 year c/o abdominal pannus and skin redundancy hanging below pubis causing lower back pain, frequent skin rash treated multiple times with antifungal and antibiotics remedies and difficulty with activities of daily living including ambulation. Patient has to hold her pannus when using the restroom and wears compressive garments daily due to discomfort. \par Denies any h/o DVT or MRSA skin infections. \par \par Here to discuss panniculectomy.\par \par Occupation - unemployed, stay at home mom \par Nonsmoker \par \par Interval hx (9/24/21). Patient presents today POD#1 s/p Courtney de-lis panniculectomy. Doing very well with adequate analgesia, not requiring Tramadol. Ambulating without difficulty. Taking oral antibiotics and performing drain care as instructed. Denies any f/c, SOB or bleeding. Drains functional with appropriate output. \par \par Interval hx (9/30/21). Patient presents today POD#7 s/p Courtney de-lis panniculectomy. Offers no significant  complaints. Denies any fever, chills or bleeding. Right drain 30/20/20, left drain 20/15/10.\par \par Interval hx (10/8/21):  here  POD#15 s/p s/p Courtney de-lis panniculectomy. Offers no significant  complaints. Denies fevers, chills, bleeding. Right drain 13/13/5.\par \par Interval hx (10/22/21):  here  POD#29 s/p s/p Courtney de-lis panniculectomy. Offers no significant  complaints. Using scar cream and very happy with results.\par \par Interval hx (11/5/21):  here  6 weeks s/p s/p Courtney de-lis panniculectomy. No complaints, except dog ear at xyphoid\par \par Interval hx (11/12/21): Pt presents today due to concern for small new wound at T-point. Denies f/c or pain.\par \par Interval hx (12/13/21):  Patient presents today 11 weeks s/p s/p Courtney de-lis panniculectomy.  Denies fevers, chills.  Reports having pinpoint incision opening 3 weeks ago, no erythema, pt applied bacitracin with complete epithelialization. c/o superior vertical dogear deformity.\par \par Interval hx (3/28/22):  Here 6 months s/p Courtney de-lis panniculectomy. c/o painful scar of lower T-point scar with palpable bump, exacerbated with menses.  c/o dogear deformity near bra and widened scar.  here to discuss surgical options\par \par Interval hx (6/9/22). Patient presents today POD#7 s/p excision of painful abdominal scars. Doing very well with no significant complaints. Taking all prescribed medications. Denies any f/c or drainage. \par \par Interval hx (6/17/22) Pt seen by Jo Jovel PA-C: Pt is 15 days s/p excision of abd scars. Denies fever/chills/drainage/bleeding. Mild intermittent sharp shooting pains right lower abd. No longer taking pain medication or abx. Pt has been up and walking. \par \par Interval hx (7/28/22): Pt is 8 weeks s/p excision of abd scars and doing well. Reports incisional pain resolved. Denies any issues with incision, using cocoa butter only. Has been exercising regularly.  Pt happy with results

## 2022-07-28 NOTE — ASSESSMENT
[FreeTextEntry1] : 32 yo F with h/o morbid obesity with MWL s/p sleeve gastrectomy who is a good candidate for body contouring surgery.  Grade III hanging abdominal pannus over BL hip and thighs.\par \par 6 months s/p Courtney de Lis extended panniculectomy. Doing very well.\par \par Now 8 weeks s/p painful scar revision of upper and lower abdominal scar and umbilicus. Doing well. Happy with results \par \par - heat/massage for intermittent neuropathic pain\par - Cleared for all activity without restrictions\par -  abdominal binder only pRn for pts comfort \par - Scar management: Recommended pt start silicone based scar cream for pigmented courtney di lis incision\par - post-op instructions discussed and all questions were answered\par - f/u 3 months with \par \par Due to COVID-19, pre-visit patient instructions were explained to the patient and their symptoms were checked upon arrival. Masks were used by the healthcare provider and staff and the examination room was cleaned after the patient visit concluded\par \par

## 2022-07-28 NOTE — PHYSICAL EXAM
[de-identified] : well developed pleasant female, NAD [de-identified] : Lower abd incision well healed. Courtney de lis incision is c/d/i, hyperpigmented, no evidence of HTS. Umbilicus viable, very mild puckering at xiphoid incision, great contour

## 2022-09-20 NOTE — PLAN
Statement Selected [FreeTextEntry1] : PLAN: Continue with behavioral changes.\par            RTO in 6 months.\par            Will mail 1 year hand book.\par            Call with concerns.

## 2022-10-13 ENCOUNTER — NON-APPOINTMENT (OUTPATIENT)
Age: 32
End: 2022-10-13

## 2022-10-13 ENCOUNTER — RESULT REVIEW (OUTPATIENT)
Age: 32
End: 2022-10-13

## 2022-10-25 ENCOUNTER — APPOINTMENT (OUTPATIENT)
Dept: SURGERY | Facility: CLINIC | Age: 32
End: 2022-10-25

## 2022-10-25 ENCOUNTER — OUTPATIENT (OUTPATIENT)
Dept: OUTPATIENT SERVICES | Facility: HOSPITAL | Age: 32
LOS: 1 days | Discharge: HOME | End: 2022-10-25

## 2022-10-25 DIAGNOSIS — Z98.890 OTHER SPECIFIED POSTPROCEDURAL STATES: Chronic | ICD-10-CM

## 2022-10-25 DIAGNOSIS — Z90.3 ACQUIRED ABSENCE OF STOMACH [PART OF]: Chronic | ICD-10-CM

## 2022-10-25 DIAGNOSIS — G89.18 OTHER ACUTE POSTPROCEDURAL PAIN: ICD-10-CM

## 2022-10-25 DIAGNOSIS — K08.409 PARTIAL LOSS OF TEETH, UNSPECIFIED CAUSE, UNSPECIFIED CLASS: Chronic | ICD-10-CM

## 2022-10-25 PROBLEM — Z00.00 ENCOUNTER FOR PREVENTIVE HEALTH EXAMINATION: Noted: 2022-10-25

## 2022-10-25 PROCEDURE — 76705 ECHO EXAM OF ABDOMEN: CPT | Mod: 26

## 2022-11-18 ENCOUNTER — APPOINTMENT (OUTPATIENT)
Dept: PLASTIC SURGERY | Facility: CLINIC | Age: 32
End: 2022-11-18

## 2022-11-18 PROCEDURE — 99212 OFFICE O/P EST SF 10 MIN: CPT

## 2022-11-18 NOTE — ASSESSMENT
[FreeTextEntry1] : 32 yo F with h/o morbid obesity with MWL s/p sleeve gastrectomy who is a good candidate for body contouring surgery.  Grade III hanging abdominal pannus over BL hip and thighs.\par \par s/p Courtney Encarnacion extended panniculectomy. Doing very well.\par \par Now 5.5 months weeks s/p painful scar revision of upper and lower abdominal scar and umbilicus. Doing well. Happy with results \par \par - heat/massage for intermittent right abdominal wall discomfort\par - Cleared for all activity without restrictions\par - DC scar cream\par - once weight stable may evaluate for possible thigh lift \par - f/u in 6 months with Dr. Barfield\par \par Photos taken with patient consent \par \par Due to COVID-19, pre-visit patient instructions were explained to the patient and their symptoms were checked upon arrival. Masks were used by the healthcare provider and staff and the examination room was cleaned after the patient visit concluded\par \par

## 2022-11-18 NOTE — PHYSICAL EXAM
[de-identified] : well developed pleasant female, NAD [de-identified] : Lower abd incision well healed. Courtney de lis incision is c/d/i, hyperpigmented, no evidence of HTS. Umbilicus viable; T-incision at xyphoid with excellent great contour. No masses appreciated on palpation over the region of reported pain.

## 2022-11-18 NOTE — HISTORY OF PRESENT ILLNESS
[FreeTextEntry1] : 31 yo F with PMHx of morbid obesity, vertigo (BPPV), cystic acne and benign heart murmur who presents today for panniculectomy consultation. Patient's max weight was 310lbs before undergoing sleeve gastrectomy with Dr. Sage in 2019 now at 173 lbs stable for 1 year c/o abdominal pannus and skin redundancy hanging below pubis causing lower back pain, frequent skin rash treated multiple times with antifungal and antibiotics remedies and difficulty with activities of daily living including ambulation. Patient has to hold her pannus when using the restroom and wears compressive garments daily due to discomfort. \par Denies any h/o DVT or MRSA skin infections. \par \par Here to discuss panniculectomy.\par \par Occupation - unemployed, stay at home mom \par Nonsmoker \par \par Interval hx (9/24/21). Patient presents today POD#1 s/p Courtney de-lis panniculectomy. Doing very well with adequate analgesia, not requiring Tramadol. Ambulating without difficulty. Taking oral antibiotics and performing drain care as instructed. Denies any f/c, SOB or bleeding. Drains functional with appropriate output. \par \par Interval hx (9/30/21). Patient presents today POD#7 s/p Courtney de-lis panniculectomy. Offers no significant  complaints. Denies any fever, chills or bleeding. Right drain 30/20/20, left drain 20/15/10.\par \par Interval hx (10/8/21):  here  POD#15 s/p s/p Courtney de-lis panniculectomy. Offers no significant  complaints. Denies fevers, chills, bleeding. Right drain 13/13/5.\par \par Interval hx (10/22/21):  here  POD#29 s/p s/p Courtney de-lis panniculectomy. Offers no significant  complaints. Using scar cream and very happy with results.\par \par Interval hx (11/5/21):  here  6 weeks s/p s/p Courtney de-lis panniculectomy. No complaints, except dog ear at xyphoid\par \par Interval hx (11/12/21): Pt presents today due to concern for small new wound at T-point. Denies f/c or pain.\par \par Interval hx (12/13/21):  Patient presents today 11 weeks s/p s/p Courtney de-lis panniculectomy.  Denies fevers, chills.  Reports having pinpoint incision opening 3 weeks ago, no erythema, pt applied bacitracin with complete epithelialization. c/o superior vertical dogear deformity.\par \par Interval hx (3/28/22):  Here 6 months s/p Courtney de-lis panniculectomy. c/o painful scar of lower T-point scar with palpable bump, exacerbated with menses.  c/o dogear deformity near bra and widened scar.  here to discuss surgical options\par \par Interval hx (6/9/22). Patient presents today POD#7 s/p excision of painful abdominal scars. Doing very well with no significant complaints. Taking all prescribed medications. Denies any f/c or drainage. \par \par Interval hx (6/17/22) Pt seen by Jo Jovel PA-C: Pt is 15 days s/p excision of abd scars. Denies fever/chills/drainage/bleeding. Mild intermittent sharp shooting pains right lower abd. No longer taking pain medication or abx. Pt has been up and walking. \par \par Interval hx (7/28/22): Pt is 8 weeks s/p excision of abd scars and doing well. Reports incisional pain resolved. Denies any issues with incision, using cocoa butter only. Has been exercising regularly.  Pt happy with results \par \par Interval hx (11/18/22): 5.5 months s/p excision of abdominal scar. Patient reports continued intermittent cramping like pain of the right rectus muscle. She also reports a 10lb weight gain; she is working out 3x/week.  She has noted more skin laxity of thighs.

## 2023-05-15 ENCOUNTER — APPOINTMENT (OUTPATIENT)
Dept: PLASTIC SURGERY | Facility: CLINIC | Age: 33
End: 2023-05-15
Payer: MEDICAID

## 2023-05-15 PROCEDURE — 99212 OFFICE O/P EST SF 10 MIN: CPT

## 2023-05-15 NOTE — PHYSICAL EXAM
[de-identified] : well developed pleasant female, NAD [de-identified] : Lower abd incision well healed. Courtney de lis incision well-healed; no evidence of HTS. Umbilicus viable; T-incision at xyphoid with excellent great contour. No masses appreciated on palpation over the region of reported pain.   BL hip scar bulging soft tissue [de-identified] : BL thigh with significant thigh residual adiposity and laxity, no hanging skin

## 2023-05-15 NOTE — HISTORY OF PRESENT ILLNESS
[FreeTextEntry1] : 31 yo F with PMHx of morbid obesity, vertigo (BPPV), cystic acne and benign heart murmur who presents today for panniculectomy consultation. Patient's max weight was 310lbs before undergoing sleeve gastrectomy with Dr. Sage in 2019 now at 173 lbs stable for 1 year c/o abdominal pannus and skin redundancy hanging below pubis causing lower back pain, frequent skin rash treated multiple times with antifungal and antibiotics remedies and difficulty with activities of daily living including ambulation. Patient has to hold her pannus when using the restroom and wears compressive garments daily due to discomfort. \par Denies any h/o DVT or MRSA skin infections. \par \par Here to discuss panniculectomy.\par \par Occupation - unemployed, stay at home mom \par Nonsmoker \par \par Interval hx (9/24/21). Patient presents today POD#1 s/p Courtney de-lis panniculectomy. Doing very well with adequate analgesia, not requiring Tramadol. Ambulating without difficulty. Taking oral antibiotics and performing drain care as instructed. Denies any f/c, SOB or bleeding. Drains functional with appropriate output. \par \par Interval hx (9/30/21). Patient presents today POD#7 s/p Courtney de-lis panniculectomy. Offers no significant  complaints. Denies any fever, chills or bleeding. Right drain 30/20/20, left drain 20/15/10.\par \par Interval hx (10/8/21):  here  POD#15 s/p s/p Courtney de-lis panniculectomy. Offers no significant  complaints. Denies fevers, chills, bleeding. Right drain 13/13/5.\par \par Interval hx (10/22/21):  here  POD#29 s/p s/p Courtney de-lis panniculectomy. Offers no significant  complaints. Using scar cream and very happy with results.\par \par Interval hx (11/5/21):  here  6 weeks s/p s/p Courtney de-lis panniculectomy. No complaints, except dog ear at xyphoid\par \par Interval hx (11/12/21): Pt presents today due to concern for small new wound at T-point. Denies f/c or pain.\par \par Interval hx (12/13/21):  Patient presents today 11 weeks s/p s/p Courtney de-lis panniculectomy.  Denies fevers, chills.  Reports having pinpoint incision opening 3 weeks ago, no erythema, pt applied bacitracin with complete epithelialization. c/o superior vertical dogear deformity.\par \par Interval hx (3/28/22):  Here 6 months s/p Courtney de-lis panniculectomy. c/o painful scar of lower T-point scar with palpable bump, exacerbated with menses.  c/o dogear deformity near bra and widened scar.  here to discuss surgical options\par \par Interval hx (6/9/22). Patient presents today POD#7 s/p excision of painful abdominal scars. Doing very well with no significant complaints. Taking all prescribed medications. Denies any f/c or drainage. \par \par Interval hx (6/17/22) Pt seen by Jo Jovel PA-C: Pt is 15 days s/p excision of abd scars. Denies fever/chills/drainage/bleeding. Mild intermittent sharp shooting pains right lower abd. No longer taking pain medication or abx. Pt has been up and walking. \par \par Interval hx (7/28/22): Pt is 8 weeks s/p excision of abd scars and doing well. Reports incisional pain resolved. Denies any issues with incision, using cocoa butter only. Has been exercising regularly.  Pt happy with results \par \par Interval hx (11/18/22): 5.5 months s/p excision of abdominal scar. Patient reports continued intermittent cramping like pain of the right rectus muscle. She also reports a 10lb weight gain; she is working out 3x/week.  She has noted more skin laxity of thighs.\par \par Inteval hx (5/15/23):  11.5 months  s/p excision of abdominal scar.  She has been maintaining weight per pt.  She is now 190s.  c/o BL hip bulge; having difficulty with fitting in clothes/pants.

## 2023-05-15 NOTE — ASSESSMENT
[FreeTextEntry1] : 30 yo F with h/o morbid obesity with MWL s/p sleeve gastrectomy who is a good candidate for body contouring surgery.  Grade III hanging abdominal pannus over BL hip and thighs.\par \par s/p Courtney Encarnacion extended panniculectomy. Doing very well.\par \par Now 11.5 months weeks s/p painful scar revision of upper and lower abdominal scar and umbilicus. Doing well. Happy with results \par \par - recommend weight loss to 170s before considering BL hip scar, followed by staged thighplasty pending re-evaluation\par - once weight stable may evaluate for BL hip scar revision (debulking)\par - f/u after weight loss\par \par \par Due to COVID-19, pre-visit patient instructions were explained to the patient and their symptoms were checked upon arrival. Masks were used by the healthcare provider and staff and the examination room was cleaned after the patient visit concluded\par \par

## 2023-11-23 NOTE — PATIENT PROFILE ADULT - IS THERE A SUSPICION OF ABUSE/NEGLIGENCE?
Bedside and Verbal shift change report given to [address] (oncoming nurse) by Ming Davis (offgoing nurse). Report included the following information Nurse Handoff Report and Recent Results. [id number] Dr. Annia Kathleen at bedside to examine Pt's mouth, multiple large blood clots suctioned, no clear source of bleeding. During this process, Pt's RR and HR increased, BP dropped, requiring increased doses of Pal and Norepi. 2148 Pt still tachypneic (40s) and hypotensive refractory to PRN versed, art line abruptly lost pulsatility, however jugular pulse easily palpable, no change in HR. Pal and Norepi gtts increased, Dr. Annia Kathleen to bedside. BP improved quickly with increased doses of Pal and Norepi. Albumin hung to gravity, repeat CBC ordered. 2245 Hgb 6.3, Dr. Paige Saleh notified, order received for 1 unit PRBCs. Bedside and Verbal shift change report given to [address] (oncoming nurse) by Lan Khan (offgoing nurse). Report included the following information Nurse Handoff Report and Recent Results. no

## 2024-04-26 ENCOUNTER — APPOINTMENT (OUTPATIENT)
Dept: OBGYN | Facility: CLINIC | Age: 34
End: 2024-04-26
Payer: MEDICAID

## 2024-04-26 ENCOUNTER — OUTPATIENT (OUTPATIENT)
Dept: OUTPATIENT SERVICES | Facility: HOSPITAL | Age: 34
LOS: 1 days | End: 2024-04-26
Payer: MEDICAID

## 2024-04-26 VITALS
DIASTOLIC BLOOD PRESSURE: 70 MMHG | BODY MASS INDEX: 35.17 KG/M2 | SYSTOLIC BLOOD PRESSURE: 122 MMHG | WEIGHT: 206 LBS | HEIGHT: 64 IN

## 2024-04-26 DIAGNOSIS — Z90.3 ACQUIRED ABSENCE OF STOMACH [PART OF]: Chronic | ICD-10-CM

## 2024-04-26 DIAGNOSIS — Z98.890 OTHER SPECIFIED POSTPROCEDURAL STATES: Chronic | ICD-10-CM

## 2024-04-26 DIAGNOSIS — Z83.438 FAMILY HISTORY OF OTHER DISORDER OF LIPOPROTEIN METABOLISM AND OTHER LIPIDEMIA: ICD-10-CM

## 2024-04-26 DIAGNOSIS — Z83.3 FAMILY HISTORY OF DIABETES MELLITUS: ICD-10-CM

## 2024-04-26 DIAGNOSIS — K08.409 PARTIAL LOSS OF TEETH, UNSPECIFIED CAUSE, UNSPECIFIED CLASS: Chronic | ICD-10-CM

## 2024-04-26 DIAGNOSIS — Z82.49 FAMILY HISTORY OF ISCHEMIC HEART DISEASE AND OTHER DISEASES OF THE CIRCULATORY SYSTEM: ICD-10-CM

## 2024-04-26 DIAGNOSIS — Z30.430 ENCOUNTER FOR INSERTION OF INTRAUTERINE CONTRACEPTIVE DEVICE: ICD-10-CM

## 2024-04-26 PROCEDURE — 58300 INSERT INTRAUTERINE DEVICE: CPT

## 2024-04-26 NOTE — DISCUSSION/SUMMARY
[FreeTextEntry1] : 34 y/o P1 s/p uncomplicated paragard IUD insertion - Bleeding precautions discussed - Follow up 4 weeks for string check.

## 2024-04-26 NOTE — PHYSICAL EXAM
[Soft] : soft [Non-tender] : non-tender [Non-distended] : non-distended [Normal] : normal [FreeTextEntry6] : 7 week size, anteverted uterus, no adnexal tenderness

## 2024-04-26 NOTE — HISTORY OF PRESENT ILLNESS
[N] : Patient reports normal menses [IUD] : has an intrauterine device [Y] : Positive pregnancy history [PapSmeardate] : 2/27/24 [TextBox_31] : NILM/HPV neg [LMPDate] : 4/12/24 [de-identified] : Paragard [PGxTotal] : 1 [BannerxFulerm] : 1 [PGHxPremature] : 0 [PGHxAbortions] : 0 [[facility] Center[facility]] : 1 [FreeTextEntry1] :  x1

## 2024-04-29 DIAGNOSIS — Z30.430 ENCOUNTER FOR INSERTION OF INTRAUTERINE CONTRACEPTIVE DEVICE: ICD-10-CM

## 2024-05-01 LAB
HCG UR QL: NEGATIVE
QUALITY CONTROL: YES

## 2024-05-23 NOTE — PATIENT PROFILE ADULT - OVER THE PAST TWO WEEKS HAVE YOU FELT DOWN, DEPRESSED OR HOPELESS?
Quality 226: Preventive Care And Screening: Tobacco Use: Screening And Cessation Intervention: Patient screened for tobacco use and is an ex/non-smoker Detail Level: Detailed Quality 130: Documentation Of Current Medications In The Medical Record: Current Medications Documented no

## 2024-05-24 ENCOUNTER — OUTPATIENT (OUTPATIENT)
Dept: OUTPATIENT SERVICES | Facility: HOSPITAL | Age: 34
LOS: 1 days | End: 2024-05-24
Payer: MEDICAID

## 2024-05-24 ENCOUNTER — APPOINTMENT (OUTPATIENT)
Dept: OBGYN | Facility: CLINIC | Age: 34
End: 2024-05-24
Payer: MEDICAID

## 2024-05-24 VITALS — DIASTOLIC BLOOD PRESSURE: 70 MMHG | SYSTOLIC BLOOD PRESSURE: 103 MMHG | BODY MASS INDEX: 35.53 KG/M2 | WEIGHT: 207 LBS

## 2024-05-24 DIAGNOSIS — K08.409 PARTIAL LOSS OF TEETH, UNSPECIFIED CAUSE, UNSPECIFIED CLASS: Chronic | ICD-10-CM

## 2024-05-24 DIAGNOSIS — Z98.890 OTHER SPECIFIED POSTPROCEDURAL STATES: Chronic | ICD-10-CM

## 2024-05-24 DIAGNOSIS — Z00.00 ENCOUNTER FOR GENERAL ADULT MEDICAL EXAMINATION WITHOUT ABNORMAL FINDINGS: ICD-10-CM

## 2024-05-24 DIAGNOSIS — Z90.3 ACQUIRED ABSENCE OF STOMACH [PART OF]: Chronic | ICD-10-CM

## 2024-05-24 PROCEDURE — 99213 OFFICE O/P EST LOW 20 MIN: CPT

## 2024-05-24 NOTE — DISCUSSION/SUMMARY
[FreeTextEntry1] : 34 y/o s/p uncomplicated IUD string check - Monthly string checks encouraged - RTC 1 year for annual.

## 2024-05-29 DIAGNOSIS — Z30.431 ENCOUNTER FOR ROUTINE CHECKING OF INTRAUTERINE CONTRACEPTIVE DEVICE: ICD-10-CM

## 2024-06-13 NOTE — ED ADULT TRIAGE NOTE - AS TEMP SITE
Medication(s) requested:  amphetamine-dextroamphetamine XR (Adderall XR) 10 MG   Last office visit: 3/26/24  Next office visit: 8/1/24  Last refill given: 5/17/24 (24 days worth)      Is the patient due for refill of this medication(s): Yes  PDMP review: Criteria not met because can't access pdmp, np will check. Forwarded to Physician/ZARA for further review and decision on medication(s) requested.      
Patient called about refill on RX  amphetamine-dextroamphetamine XR (Adderall XR) 10 MG, patient stated he forgot to change pharmacy and would like RX sent to [id number] - [place], [place] - [address] AT [facility] [address] [facility]. Please advise.  
Patient calling back in regards to update on RX refill. I did remind patient of timeframe for refill requests. Patient stated understanding. Would like a call when refill is put in. Patient stated he leaves for out of town in one hour.   
Patient calling for update regarding RX, would like to know if this will be filled today due to leaving for work out of [facility]. Please advise.   
Writer contacted [facility] in [place] and cancelled Adderall prescription.    RX refill routed to PCP for another refill to local pharmacy.  
oral

## 2024-07-16 ENCOUNTER — NON-APPOINTMENT (OUTPATIENT)
Age: 34
End: 2024-07-16

## 2024-07-29 ENCOUNTER — APPOINTMENT (OUTPATIENT)
Dept: PLASTIC SURGERY | Facility: CLINIC | Age: 34
End: 2024-07-29
Payer: MEDICAID

## 2024-07-29 VITALS — BODY MASS INDEX: 36.02 KG/M2 | HEIGHT: 64 IN | WEIGHT: 211 LBS

## 2024-07-29 PROCEDURE — 99213 OFFICE O/P EST LOW 20 MIN: CPT

## 2024-07-29 NOTE — PHYSICAL EXAM
[de-identified] : .atnc  [de-identified] : well developed pleasant female, NAD [de-identified] : SHERYLs [de-identified] : Non labored on RA [de-identified] : TONOR [de-identified] : Lower abd incision well healed. Courtney de lis incision well-healed; no evidence of HTS. Umbilicus viable; T-incision at xyphoid with excellent great contour. No masses appreciated on palpation over the region of reported pain.   BL hip scar bulging soft tissue surrounding panniculectomy scar.  No surround rash. [de-identified] : BL thigh with significant thigh residual adiposity and laxity, no hanging skin

## 2024-07-29 NOTE — HISTORY OF PRESENT ILLNESS
[FreeTextEntry1] : 31 yo F with PMHx of morbid obesity, vertigo (BPPV), cystic acne and benign heart murmur who presents today for panniculectomy consultation. Patient's max weight was 310lbs before undergoing sleeve gastrectomy with Dr. Sage in 2019 now at 173 lbs stable for 1 year c/o abdominal pannus and skin redundancy hanging below pubis causing lower back pain, frequent skin rash treated multiple times with antifungal and antibiotics remedies and difficulty with activities of daily living including ambulation. Patient has to hold her pannus when using the restroom and wears compressive garments daily due to discomfort.  Denies any h/o DVT or MRSA skin infections.   Here to discuss panniculectomy.  Occupation - unemployed, stay at home mom  Nonsmoker   Interval hx (9/24/21). Patient presents today POD#1 s/p Courtney de-lis panniculectomy. Doing very well with adequate analgesia, not requiring Tramadol. Ambulating without difficulty. Taking oral antibiotics and performing drain care as instructed. Denies any f/c, SOB or bleeding. Drains functional with appropriate output.   Interval hx (9/30/21). Patient presents today POD#7 s/p Courtney de-lis panniculectomy. Offers no significant  complaints. Denies any fever, chills or bleeding. Right drain 30/20/20, left drain 20/15/10.  Interval hx (10/8/21):  here  POD#15 s/p s/p Courtney de-lis panniculectomy. Offers no significant  complaints. Denies fevers, chills, bleeding. Right drain 13/13/5.  Interval hx (10/22/21):  here  POD#29 s/p s/p Courtney de-lis panniculectomy. Offers no significant  complaints. Using scar cream and very happy with results.  Interval hx (11/5/21):  here  6 weeks s/p s/p Courtney de-lis panniculectomy. No complaints, except dog ear at xyphoid  Interval hx (11/12/21): Pt presents today due to concern for small new wound at T-point. Denies f/c or pain.  Interval hx (12/13/21):  Patient presents today 11 weeks s/p s/p Courtney de-lis panniculectomy.  Denies fevers, chills.  Reports having pinpoint incision opening 3 weeks ago, no erythema, pt applied bacitracin with complete epithelialization. c/o superior vertical dogear deformity.  Interval hx (3/28/22):  Here 6 months s/p Courtney de-lis panniculectomy. c/o painful scar of lower T-point scar with palpable bump, exacerbated with menses.  c/o dogear deformity near bra and widened scar.  here to discuss surgical options  Interval hx (6/9/22). Patient presents today POD#7 s/p excision of painful abdominal scars. Doing very well with no significant complaints. Taking all prescribed medications. Denies any f/c or drainage.   Interval hx (6/17/22) Pt seen by Jo Jovel PA-C: Pt is 15 days s/p excision of abd scars. Denies fever/chills/drainage/bleeding. Mild intermittent sharp shooting pains right lower abd. No longer taking pain medication or abx. Pt has been up and walking.   Interval hx (7/28/22): Pt is 8 weeks s/p excision of abd scars and doing well. Reports incisional pain resolved. Denies any issues with incision, using cocoa butter only. Has been exercising regularly.  Pt happy with results   Interval hx (11/18/22): 5.5 months s/p excision of abdominal scar. Patient reports continued intermittent cramping like pain of the right rectus muscle. She also reports a 10lb weight gain; she is working out 3x/week.  She has noted more skin laxity of thighs.  Inteval hx (5/15/23):  11.5 months  s/p excision of abdominal scar.  She has been maintaining weight per pt.  She is now 190s.  c/o BL hip bulge; having difficulty with fitting in clothes/pants.  Inteval hx (7/29/24): Pt here for follow up regarding bilateral hip bulging around his panniculectomy scar Pt states that she has increased her resistance training / weight lifting, which has led to her weight stability / slight gain. Pt states that she is happy where she is weight-wise. Denies any changes in medications or medical hx since last visit.    hip bulge associated with difficulty fitting in clothes and pants, also associated tenderness

## 2024-07-29 NOTE — ASSESSMENT
[FreeTextEntry1] : 32yo F with h/o morbid obesity with MWL s/p sleeve gastrectomy who is a good candidate for body contouring surgery.  Grade III hanging abdominal pannus over BL hip and thighs.  s/p Courtney Encarnacion extended panniculectomy Sept 2021 with revision of upper and lower abdominal scar and umbilicus June 2022.   Here for consultation regarding excision of bilateral hip bulge.   Recommend bilateral hip scar revision and local tissue rearrangement to address large dogear deformity of her scars  -I reviewed the treatment options of observation vs. surgical excision, and I discussed the benefits, risks, and outcomes of each treatment option. -Regarding surgical excision of mass, the risks include but are not limited to bleeding, infection, hematoma, nerve complications, wound separation, poor wound healing, contour defect, scarring, hypertrophic scar/keloid formation, need for unplanned surgery, and dissatisfaction with outcome. -The patient understands the risks. All questions were answered to the patient's apparent satisfaction. -Informed consent was obtained. -Will schedule at patient's earliest convenience -Photos taken.

## 2024-09-12 NOTE — ASU PATIENT PROFILE, ADULT - BLOOD TRANSFUSION, PREVIOUS, PROFILE
In an effort to ensure that our patients LiveWell, a Team Member has reviewed your chart and identified an opportunity to provide the best care possible. An attempt was made to discuss or schedule due or overdue Preventive or Chronic Condition care.Care Gaps identified: Diabetes Urine Testing, Eye Exam, and Foot Exam and Immunizations.    The Outcome was Contact was not made, letter/portal message sent.  We are attempting to schedule a yearly wellness visit. If you have any questions or need help with scheduling, contact our Health Outreach Team at [phone].   Type of Appointment needed: Primary Care Visit   no

## 2024-09-16 ENCOUNTER — APPOINTMENT (OUTPATIENT)
Dept: ORTHOPEDIC SURGERY | Facility: CLINIC | Age: 34
End: 2024-09-16
Payer: MEDICAID

## 2024-09-16 DIAGNOSIS — S39.012A STRAIN OF MUSCLE, FASCIA AND TENDON OF LOWER BACK, INITIAL ENCOUNTER: ICD-10-CM

## 2024-09-16 PROCEDURE — 72110 X-RAY EXAM L-2 SPINE 4/>VWS: CPT

## 2024-09-16 PROCEDURE — 99203 OFFICE O/P NEW LOW 30 MIN: CPT

## 2024-09-16 NOTE — HISTORY OF PRESENT ILLNESS
[de-identified] : Ms. Felipe is a 33 year old female who presents to the office for evaluation of lower back pain x 3-4 weeks. She states she cannot pinpoint a specific injury but thinks she may have aggravated it the gym.  She states she weightlifts and does wear a belt and has never had an issue before.  She denies any radicular component, lower extremity weakness, or parasthesias.  She is not taking anything for pain and denies any loss of motion.  She states the cold weather worsens her pain and states it most occurs when she straightens up from a bent position or when she gets up from a seated position.

## 2024-09-16 NOTE — IMAGING
[de-identified] : lumbar spine: mild paraspinal muscle tenderness, NV intact, full ROM, 5/5 strength, 2+ reflexes, - SLR bilaterally.   X-ray lumbar spine 4 views:  no fractures or bony abnormalities noted.

## 2024-09-16 NOTE — ASSESSMENT
[FreeTextEntry1] : 33 year old female with lumbar strain.  I have provided her with prescription to attend PT 2-3 times per week for 6 weeks.  She will f/u in 6 weeks for reevaluation and if there is any issue she will contact the office.

## 2024-10-28 ENCOUNTER — APPOINTMENT (OUTPATIENT)
Dept: ORTHOPEDIC SURGERY | Facility: CLINIC | Age: 34
End: 2024-10-28

## 2024-12-12 ENCOUNTER — OUTPATIENT (OUTPATIENT)
Dept: OUTPATIENT SERVICES | Facility: HOSPITAL | Age: 34
LOS: 1 days | End: 2024-12-12
Payer: MEDICAID

## 2024-12-12 VITALS
DIASTOLIC BLOOD PRESSURE: 74 MMHG | SYSTOLIC BLOOD PRESSURE: 112 MMHG | HEART RATE: 85 BPM | OXYGEN SATURATION: 97 % | RESPIRATION RATE: 18 BRPM | WEIGHT: 205.03 LBS | TEMPERATURE: 98 F | HEIGHT: 64 IN

## 2024-12-12 DIAGNOSIS — Z90.3 ACQUIRED ABSENCE OF STOMACH [PART OF]: Chronic | ICD-10-CM

## 2024-12-12 DIAGNOSIS — Z98.890 OTHER SPECIFIED POSTPROCEDURAL STATES: Chronic | ICD-10-CM

## 2024-12-12 DIAGNOSIS — L90.5 SCAR CONDITIONS AND FIBROSIS OF SKIN: ICD-10-CM

## 2024-12-12 DIAGNOSIS — K08.409 PARTIAL LOSS OF TEETH, UNSPECIFIED CAUSE, UNSPECIFIED CLASS: Chronic | ICD-10-CM

## 2024-12-12 DIAGNOSIS — Z01.818 ENCOUNTER FOR OTHER PREPROCEDURAL EXAMINATION: ICD-10-CM

## 2024-12-12 LAB
ALBUMIN SERPL ELPH-MCNC: 4.8 G/DL — SIGNIFICANT CHANGE UP (ref 3.5–5.2)
ALP SERPL-CCNC: 71 U/L — SIGNIFICANT CHANGE UP (ref 30–115)
ALT FLD-CCNC: 38 U/L — SIGNIFICANT CHANGE UP (ref 0–41)
ANION GAP SERPL CALC-SCNC: 13 MMOL/L — SIGNIFICANT CHANGE UP (ref 7–14)
AST SERPL-CCNC: 26 U/L — SIGNIFICANT CHANGE UP (ref 0–41)
BASOPHILS # BLD AUTO: 0.05 K/UL — SIGNIFICANT CHANGE UP (ref 0–0.2)
BASOPHILS NFR BLD AUTO: 1 % — SIGNIFICANT CHANGE UP (ref 0–1)
BILIRUB SERPL-MCNC: 0.3 MG/DL — SIGNIFICANT CHANGE UP (ref 0.2–1.2)
BUN SERPL-MCNC: 10 MG/DL — SIGNIFICANT CHANGE UP (ref 10–20)
CALCIUM SERPL-MCNC: 9.8 MG/DL — SIGNIFICANT CHANGE UP (ref 8.4–10.5)
CHLORIDE SERPL-SCNC: 99 MMOL/L — SIGNIFICANT CHANGE UP (ref 98–110)
CO2 SERPL-SCNC: 23 MMOL/L — SIGNIFICANT CHANGE UP (ref 17–32)
CREAT SERPL-MCNC: 0.8 MG/DL — SIGNIFICANT CHANGE UP (ref 0.7–1.5)
EGFR: 100 ML/MIN/1.73M2 — SIGNIFICANT CHANGE UP
EOSINOPHIL # BLD AUTO: 0.05 K/UL — SIGNIFICANT CHANGE UP (ref 0–0.7)
EOSINOPHIL NFR BLD AUTO: 1 % — SIGNIFICANT CHANGE UP (ref 0–8)
GLUCOSE SERPL-MCNC: 95 MG/DL — SIGNIFICANT CHANGE UP (ref 70–99)
HCT VFR BLD CALC: 42.6 % — SIGNIFICANT CHANGE UP (ref 37–47)
HGB BLD-MCNC: 14.2 G/DL — SIGNIFICANT CHANGE UP (ref 12–16)
LYMPHOCYTES # BLD AUTO: 1.33 K/UL — SIGNIFICANT CHANGE UP (ref 1–3.3)
LYMPHOCYTES # BLD AUTO: 27 % — SIGNIFICANT CHANGE UP (ref 13–44)
MANUAL SMEAR VERIFICATION: SIGNIFICANT CHANGE UP
MCHC RBC-ENTMCNC: 29 PG — SIGNIFICANT CHANGE UP (ref 27–31)
MCHC RBC-ENTMCNC: 33.3 G/DL — SIGNIFICANT CHANGE UP (ref 32–37)
MCV RBC AUTO: 87.1 FL — SIGNIFICANT CHANGE UP (ref 81–99)
MONOCYTES # BLD AUTO: 0.93 K/UL — HIGH (ref 0.1–0.6)
MONOCYTES NFR BLD AUTO: 19 % — HIGH (ref 1.7–9.3)
NEUTROPHILS # BLD AUTO: 2.5 K/UL — SIGNIFICANT CHANGE UP (ref 1.4–6.5)
NEUTROPHILS NFR BLD AUTO: 49 % — SIGNIFICANT CHANGE UP (ref 42.2–75.2)
NEUTS BAND # BLD: 2 % — SIGNIFICANT CHANGE UP (ref 0–6)
NRBC # BLD: 0 /100 WBCS — SIGNIFICANT CHANGE UP (ref 0–0)
NRBC # BLD: SIGNIFICANT CHANGE UP /100 WBCS (ref 0–0)
PLAT MORPH BLD: NORMAL — SIGNIFICANT CHANGE UP
PLATELET # BLD AUTO: 270 K/UL — SIGNIFICANT CHANGE UP (ref 130–400)
PMV BLD: 10.4 FL — SIGNIFICANT CHANGE UP (ref 7.4–10.4)
POTASSIUM SERPL-MCNC: 4.9 MMOL/L — SIGNIFICANT CHANGE UP (ref 3.5–5)
POTASSIUM SERPL-SCNC: 4.9 MMOL/L — SIGNIFICANT CHANGE UP (ref 3.5–5)
PROT SERPL-MCNC: 7.6 G/DL — SIGNIFICANT CHANGE UP (ref 6–8)
RBC # BLD: 4.89 M/UL — SIGNIFICANT CHANGE UP (ref 4.2–5.4)
RBC # FLD: 12.8 % — SIGNIFICANT CHANGE UP (ref 11.5–14.5)
RBC BLD AUTO: NORMAL — SIGNIFICANT CHANGE UP
SODIUM SERPL-SCNC: 135 MMOL/L — SIGNIFICANT CHANGE UP (ref 135–146)
VARIANT LYMPHS # BLD: 1 % — SIGNIFICANT CHANGE UP (ref 0–6)
WBC # BLD: 4.91 K/UL — SIGNIFICANT CHANGE UP (ref 4.8–10.8)
WBC # FLD AUTO: 4.91 K/UL — SIGNIFICANT CHANGE UP (ref 4.8–10.8)

## 2024-12-12 PROCEDURE — 80053 COMPREHEN METABOLIC PANEL: CPT

## 2024-12-12 PROCEDURE — 36415 COLL VENOUS BLD VENIPUNCTURE: CPT

## 2024-12-12 PROCEDURE — 85025 COMPLETE CBC W/AUTO DIFF WBC: CPT

## 2024-12-12 PROCEDURE — 99214 OFFICE O/P EST MOD 30 MIN: CPT | Mod: 25

## 2024-12-12 NOTE — H&P PST ADULT - NSICDXPASTSURGICALHX_GEN_ALL_CORE_FT
PAST SURGICAL HISTORY:  H/O gastric sleeve 2019- WT LOSS 140LB    S/P endoscopy     S/P panniculectomy     [place] teeth extracted

## 2024-12-12 NOTE — H&P PST ADULT - PATIENT'S GENDER IDENTITY
Please notify patient: H. pylori is negative no bacteria in the stomach  LDL, bad cholesterol slightly improved from prior  Continue low-carb low-fat diet, more fruits and vegetables    Otherwise labs within normal limits  continue current treatment    Future Appointments  4/4/2024   8:00 AM    Hermila Rodriguez LISW      [facility]             [facility]  5/2/2024   11:40 AM   Amari Hodges MD    [facility]          Neurology -  5/7/2024   11:00 AM   Adrianne Gill APRN * [facility]           [facility]  6/20/2024  2:45 PM    [facility]ail[facility] Thorpe [facility]    [facility]  7/16/2024  1:00 PM    Naila Thorpe MD    [facility]  
Female

## 2024-12-12 NOTE — H&P PST ADULT - HISTORY OF PRESENT ILLNESS
Patient is a 34 year old female presenting to PAST in preparation for  BILATERAL HIP SCAR REVISION AND LOCAL TISSUE REARRANGEMENT  on 12/26 under gen anesthesia by Dr Barfield  Pt  with recent weight loss has excess skin to hips scheduled for elective procedure  PATIENT CURRENTLY DENIES CHEST PAIN  SHORTNESS OF BREATH  PALPITATIONS,  DYSURIA, OR UPPER RESPIRATORY INFECTION IN PAST 2 WEEKS    Anesthesia Alert  NO--Difficult Airway  NO--History of neck surgery or radiation  NO--Limited ROM of neck  NO--History of Malignant hyperthermia  NO--Personal or family history of Pseudocholinesterase deficiency  NO--Prior Anesthesia Complication  yes--Latex Allergy  NO--Loose teeth  NO--History of Rheumatoid Arthritis  NO--STEPHEN  NO-- BLEEDING RISK  NO--Other_____        D[facility] Activity Status Index (DASI) from [employer]  on 12/12/2024      RESULT SUMMARY:  58.2 points  The higher the score (maximum 58.2), the higher the functional status.    9.89 METs        INPUTS:  Take care of self —> 2.75 = Yes  Walk indoors —> 1.75 = Yes  Walk 1&ndash;2 blocks on level ground —> 2.75 = Yes  Climb a flight of stairs or walk up a hill —> 5.5 = Yes  Run a short distance —> 8 = Yes  Do light work around the house —> 2.7 = Yes  Do moderate work around the house —> 3.5 = Yes  Do heavy work around the house —> 8 = Yes  Do yardwork —> 4.5 = Yes  Have sexual relations —> 5.25 = Yes  Participate in moderate recreational activities —> 6 = Yes  Participate in strenuous sports —> 7.5 = Yes          Revised Cardiac Risk Index for Pre-Operative Risk from [employer]  on 12/12/2024      RESULT SUMMARY:  0 points  RCRI Score    3.9 %  Risk of major cardiac event      INPUTS:  High-risk surgery —> 0 = No  History of ischemic heart disease —> 0 = No  History of congestive heart failure —> 0 = No  History of cerebrovascular disease —> 0 = No  Pre-operative treatment with insulin —> 0 = No  Pre-operative creatinine >2 mg/dL / 176.8 µmol/L —> 0 = No      As per patient, this is their complete medical and surgical history, including medications both prescribed or over the counter.  Patient verbalized understanding of instructions and was given the opportunity to ask questions and have them answered.

## 2024-12-13 DIAGNOSIS — Z01.818 ENCOUNTER FOR OTHER PREPROCEDURAL EXAMINATION: ICD-10-CM

## 2024-12-13 DIAGNOSIS — L90.5 SCAR CONDITIONS AND FIBROSIS OF SKIN: ICD-10-CM

## 2024-12-24 NOTE — ASU PATIENT PROFILE, ADULT - NSICDXPASTSURGICALHX_GEN_ALL_CORE_FT
PAST SURGICAL HISTORY:  H/O gastric sleeve 2019- WT LOSS 140LB    S/P endoscopy     S/P panniculectomy     [place] teeth extracted

## 2024-12-24 NOTE — ASU PATIENT PROFILE, ADULT - PRESSURE ULCER(S)
[de-identified] : may 18th, 2022; findings of erosive esophagitis grade 4, no stricture, small hiatal hernia,\par And there was no active lesions of lymphoma as had been previously noted.  None of the plaques previously seen [FreeTextEntry1] : Colonoscopy performed August 24, 2022 normal no

## 2024-12-26 ENCOUNTER — RESULT REVIEW (OUTPATIENT)
Age: 34
End: 2024-12-26

## 2024-12-26 ENCOUNTER — APPOINTMENT (OUTPATIENT)
Dept: PLASTIC SURGERY | Facility: AMBULATORY SURGERY CENTER | Age: 34
End: 2024-12-26
Payer: MEDICAID

## 2024-12-26 ENCOUNTER — TRANSCRIPTION ENCOUNTER (OUTPATIENT)
Age: 34
End: 2024-12-26

## 2024-12-26 ENCOUNTER — OUTPATIENT (OUTPATIENT)
Dept: OUTPATIENT SERVICES | Facility: HOSPITAL | Age: 34
LOS: 1 days | Discharge: HOME | End: 2024-12-26
Payer: MEDICAID

## 2024-12-26 VITALS
SYSTOLIC BLOOD PRESSURE: 117 MMHG | TEMPERATURE: 98 F | HEART RATE: 85 BPM | RESPIRATION RATE: 65 BRPM | HEIGHT: 64 IN | OXYGEN SATURATION: 100 % | WEIGHT: 205.03 LBS | DIASTOLIC BLOOD PRESSURE: 72 MMHG

## 2024-12-26 VITALS
RESPIRATION RATE: 22 BRPM | SYSTOLIC BLOOD PRESSURE: 118 MMHG | HEART RATE: 65 BPM | TEMPERATURE: 98 F | OXYGEN SATURATION: 99 % | DIASTOLIC BLOOD PRESSURE: 68 MMHG

## 2024-12-26 DIAGNOSIS — K08.409 PARTIAL LOSS OF TEETH, UNSPECIFIED CAUSE, UNSPECIFIED CLASS: Chronic | ICD-10-CM

## 2024-12-26 DIAGNOSIS — Z98.890 OTHER SPECIFIED POSTPROCEDURAL STATES: Chronic | ICD-10-CM

## 2024-12-26 DIAGNOSIS — Z90.3 ACQUIRED ABSENCE OF STOMACH [PART OF]: Chronic | ICD-10-CM

## 2024-12-26 DIAGNOSIS — L90.5 SCAR CONDITIONS AND FIBROSIS OF SKIN: ICD-10-CM

## 2024-12-26 PROCEDURE — C9399: CPT

## 2024-12-26 PROCEDURE — 14302 TIS TRNFR ADDL 30 SQ CM: CPT

## 2024-12-26 PROCEDURE — 88304 TISSUE EXAM BY PATHOLOGIST: CPT

## 2024-12-26 PROCEDURE — 14301 TIS TRNFR ANY 30.1-60 SQ CM: CPT

## 2024-12-26 PROCEDURE — 88304 TISSUE EXAM BY PATHOLOGIST: CPT | Mod: 26

## 2024-12-26 RX ORDER — TRAMADOL HYDROCHLORIDE 300 MG/1
1 CAPSULE ORAL
Qty: 12 | Refills: 0
Start: 2024-12-26 | End: 2024-12-28

## 2024-12-26 RX ORDER — ONDANSETRON HYDROCHLORIDE 4 MG/1
4 TABLET, FILM COATED ORAL ONCE
Refills: 0 | Status: DISCONTINUED | OUTPATIENT
Start: 2024-12-26 | End: 2024-12-26

## 2024-12-26 RX ORDER — HYDROMORPHONE HYDROCHLORIDE 2 MG/1
0.5 TABLET ORAL
Refills: 0 | Status: DISCONTINUED | OUTPATIENT
Start: 2024-12-26 | End: 2024-12-26

## 2024-12-26 RX ORDER — GABAPENTIN 300 MG/1
1 CAPSULE ORAL
Qty: 6 | Refills: 0
Start: 2024-12-26 | End: 2024-12-28

## 2024-12-26 RX ORDER — 0.9 % SODIUM CHLORIDE 0.9 %
1000 INTRAVENOUS SOLUTION INTRAVENOUS
Refills: 0 | Status: DISCONTINUED | OUTPATIENT
Start: 2024-12-26 | End: 2024-12-26

## 2024-12-26 RX ORDER — DOXYCYCLINE HYCLATE 150 MG/1
1 TABLET, COATED ORAL
Qty: 14 | Refills: 0
Start: 2024-12-26 | End: 2025-01-01

## 2024-12-26 NOTE — ASU DISCHARGE PLAN (ADULT/PEDIATRIC) - NS MD DC FALL RISK RISK
For information on Fall & Injury Prevention, visit: [email or web address] OR  [email or web address] OR  https://www.cdc.gov/steadi/patient.html Mart-1 - Positive Histology Text: MART-1 staining demonstrates areas of higher density and clustering of melanocytes with Pagetoid spread upwards within the epidermis. The surgical margins are positive for tumor cells.

## 2024-12-26 NOTE — ASU DISCHARGE PLAN (ADULT/PEDIATRIC) - ASU DC SPECIAL INSTRUCTIONSFT
keep dressing on and dry until follow-up     tylenol, gabapentin for pain. Tramadol PRN. no NSAIDs    abx as perscribed    follow up 1 week

## 2024-12-26 NOTE — ASU DISCHARGE PLAN (ADULT/PEDIATRIC) - FINANCIAL ASSISTANCE
[facility] provides services at a reduced cost to those who are determined to be eligible through [facility]’s financial assistance program. Information regarding [facility]’s financial assistance program can be found by going to [email or web address] or by calling [phone].

## 2024-12-26 NOTE — CHART NOTE - NSCHARTNOTEFT_GEN_A_CORE
Last refill 2/21/19.  Last ov 9/24/18.  Non-protocol meds.  Please advise on refills.   PACU ANESTHESIA ADMISSION NOTE      Procedure: Adjacent tissue transfer of trunk, 10.1 to 30 sq cm      Post op diagnosis:      ____  Intubated  TV:______       Rate: ______      FiO2: ______    _x___  Patent Airway    _x___  Full return of protective reflexes    _x___  Full recovery from anesthesia / back to baseline status    Vitals  SPO2:-99  HR:-94  RR:-11  B.P:-119/62  TEMP:-98.2    Mental Status:  _x___ Awake   ___x_ Alert   _____ Drowsy   _____ Sedated    Nausea/Vomiting:  _x___  NO       ______Yes,   See Post - Op Orders         Pain Scale (0-10):  __0___    Treatment: _x___ None    __x__ See Post - Op/PCA Orders    Post - Operative Fluids:   ___ Oral   ____x See Post - Op Orders    Plan: Discharge:   ____Home       ___x__Floor     _____Critical Care    _____  Other:_________________    Comments:  Report endorsed to RN in pacu  Vitals stable  No anesthesia issues or complications noted.  Discharge to patient to floor / home when criteria met.

## 2024-12-26 NOTE — ASU DISCHARGE PLAN (ADULT/PEDIATRIC) - CARE PROVIDER_API CALL
Ela Barfield  Plastic Surgery  [address] [place] [address], [address]  [place] [address]  Phone: [phone]  Fax: [phone]  Follow Up Time: 1 week

## 2024-12-31 LAB — SURGICAL PATHOLOGY STUDY: SIGNIFICANT CHANGE UP

## 2025-01-02 DIAGNOSIS — E66.9 OBESITY, UNSPECIFIED: ICD-10-CM

## 2025-01-02 DIAGNOSIS — Z98.84 BARIATRIC SURGERY STATUS: ICD-10-CM

## 2025-01-02 DIAGNOSIS — L90.5 SCAR CONDITIONS AND FIBROSIS OF SKIN: ICD-10-CM

## 2025-01-02 NOTE — PATIENT PROFILE ADULT - NSTRANSFERBELONGINGSDISPO_GEN_A_NUR
PREOPERATIVE DIAGNOSIS: severe septal deviation, inferior turbinate hypertrophy, nasal obstruction    POSTOPERATIVE DIAGNOSIS: same    PROCEDURE: Septoplasty and submucous inferior turbinate reduction    ANESTHESIA: general    BLOOD LOSS: less than 50cc    SPECIMENS:none    IMPLANTS: carlson splints    FINDINGS: severe left septal deviation and inferior turbinate hypertrophy    SURGEON: Danna Hudson MD.    INDICATIONS: see clinic notes    Patient was brought back to the operating room placed supine operating room table. Gen. anesthesia was induced and patient was endotracheally intubated without difficulty. Patient was prepped and draped in usual fashion and Afrin-soaked pledgets were placed in the bilateral nasal cavities. A greater palatine foramen block was performed by injecting lidocaine and epinephrine solution in greater palatine foramen bilaterally. A septoplasty was then performed starting with a hemitransfixion incision in the left septum. Submucoperichondrial flap was then raised. The septum was then divided at the osseous and cartilaginous junction with the sharp end of the Bolton. A right septal flap was then raised. A Madhav-Montes forceps was then used to divide the perpendicular plate of the ethmoid superiorly. A combination of the Bolton and Yuliya forceps were then used to remove all the deviated portions of the bony nasal septum. An inferior strip was removed from the cartilaginous septum in order to allow it to sit in a more medial position. A drainage incision was made posteriorly and the right flap. The septal incision was then closed with interrupted 5-0 chromic sutures. Chromic suture also used to close and left septal flap perforation at the mid point of the septum    Bilateral inferior turbinates were also injected with lidocaine and epinephrine solution. A 2.9 mm turbinate blade was then used to perform submucous inferior turbinate resection. The turbinates were then outfractured  bilaterally.  Alvarado splints were placed bilaterally and secured with a 3.0 Prolene suture.     with patient

## 2025-01-03 ENCOUNTER — APPOINTMENT (OUTPATIENT)
Dept: PLASTIC SURGERY | Facility: CLINIC | Age: 35
End: 2025-01-03
Payer: MEDICAID

## 2025-01-03 DIAGNOSIS — L90.5 PAIN, UNSPECIFIED: ICD-10-CM

## 2025-01-03 DIAGNOSIS — R52 PAIN, UNSPECIFIED: ICD-10-CM

## 2025-01-03 PROCEDURE — 99024 POSTOP FOLLOW-UP VISIT: CPT

## 2025-01-03 NOTE — ASU PREOP CHECKLIST - HEIGHT IN FEET
"Nursing noted increased edema to left leg  On exam she has +1 pitting edema to left pedal and trace non pitting to Left shin/calf   Patient c/o \"achy\" feet (bilaterally)   She did complain of calf pain upon palpation bilaterally, I do think this is more dependent edema however need to r/o dvt due to patient's limited mobility   Plan  Venous Duplex   TEDS on in am off in PM  Elevate legs when oob   " 5

## 2025-03-17 ENCOUNTER — APPOINTMENT (OUTPATIENT)
Dept: PLASTIC SURGERY | Facility: CLINIC | Age: 35
End: 2025-03-17
Payer: MEDICAID

## 2025-03-17 PROCEDURE — 99024 POSTOP FOLLOW-UP VISIT: CPT
